# Patient Record
Sex: MALE | Race: WHITE | Employment: FULL TIME | URBAN - METROPOLITAN AREA
[De-identification: names, ages, dates, MRNs, and addresses within clinical notes are randomized per-mention and may not be internally consistent; named-entity substitution may affect disease eponyms.]

---

## 2018-06-12 PROBLEM — Z86.010 HISTORY OF COLON POLYPS: Status: ACTIVE | Noted: 2018-06-12

## 2022-03-19 PROBLEM — Z86.0100 HISTORY OF COLON POLYPS: Status: ACTIVE | Noted: 2018-06-12

## 2022-03-21 ENCOUNTER — APPOINTMENT (OUTPATIENT)
Dept: PHYSICAL THERAPY | Age: 77
End: 2022-03-21
Payer: COMMERCIAL

## 2022-03-31 ENCOUNTER — HOSPITAL ENCOUNTER (OUTPATIENT)
Dept: PHYSICAL THERAPY | Age: 77
Discharge: HOME | End: 2022-03-31
Payer: COMMERCIAL

## 2022-03-31 PROCEDURE — 97162 PT EVAL MOD COMPLEX 30 MIN: CPT | Performed by: PHYSICAL THERAPIST

## 2022-03-31 PROCEDURE — 97530 THERAPEUTIC ACTIVITIES: CPT | Performed by: PHYSICAL THERAPIST

## 2022-03-31 PROCEDURE — 97140 MANUAL THERAPY 1/> REGIONS: CPT | Performed by: PHYSICAL THERAPIST

## 2022-03-31 NOTE — PROGRESS NOTES
[id number] [facility] AT [facility]  [address] [place] [address], [place], [place]  Phone [phone]  Fax [phone] / [address] [place] [address] PHYSICAL THERAPY SERVICES  Patient Name: Aashish Damon : 1945   Medical   Diagnosis: Left knee pain [M25.562] Treatment Diagnosis: S/p L TKA   Onset Date: 22     Referral Source: Tiffany Salazar MD Start of Care [facility]): 3/31/2022   Prior Hospitalization: See medical history Provider #: [id number]   Prior Level of Function: Indep with ADLS and gait. Comorbidities: DM   Medications: Verified on Patient Summary List   The Plan of Care and following information is based on the information from the initial evaluation.   =================================================================================  Assessment / key information:  Patient is a 68 y.o. female who presents to [facility] with Dx of L knee pain. Pt is s/p L TKA on 22. Pt had HH for 3 weeks and is currently using no AD. Pt lives with family in 2 story home. Pain levels range from 0-1, and takes Tylenol as needed, and using CP 2x/day. Pt c/o consistent swelling and being uncomfortable during sleep (2hrs at a time). Pt demonstrates Indep with stairs in clinic with step to pattern and using B hand rails. 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 Functional limitations: stairs, prolonged walking, squatting, dressing.    Gait: mild   [x] Antalgic      With no AD  ROM / Strength  L Knee A/PROM: -10deg to 110deg, -5deg to 115deg,  R Knee AROM0-120deg  Strength: hip flexion 4- Butch, knee extension L/R: 4/4+, Knee flexion :4/4+    Flexibility: [] Unable to assess at this time  Hamstrings:    (L) Tightness= [] WNL   [] Min   [] Mod   [] Severe -40deg   (R) Tightness= [] WNL   [] Min   [] Mod   [] Severe -28deg  Gastroc:      (L) Tightness= [] WNL   [] Min   [] Mod   [x] Severe    (R) Tightness= [] WNL   [] Min   [x] Mod [] Severe  Balance: ROM EO/EC: 30sec each  Palpation: ant/lateral/post calf  Girth Measurements: 1.5cm at L knee jt line, pitting edema noted in L Lower leg,  2.0 cm at top of calf   small steri strip attached at inferior incision,     Patient scored 59 on FOTO indicating decreased functional activity level and QOL. A home exercise program was demonstrated and provided to address the above objective and functional deficits.  Patient can benefit from PT interventions to improve AROM, strength, decrease swelling and inflammation, to facilitate ADLs & overall functional status.   =================================================================================  Eval Complexity: History: MEDIUM  Complexity : 1-2 comorbidities / personal factors will impact the outcome/ POC Exam:MEDIUM Complexity : 3 Standardized tests and measures addressing body structure, function, activity limitation and / or participation in recreation  Presentation: MEDIUM Complexity : Evolving with changing characteristics  Clinical Decision Making:MEDIUM Complexity : FOTO score of 26-74Overall Complexity:MEDIUM  Problem List: pain affecting function, decrease ROM, decrease strength, edema affecting function, impaired gait/ balance, decrease ADL/ functional abilitiies, decrease activity tolerance, decrease flexibility/ joint mobility and decrease transfer abilities   Treatment Plan may include any combination of the following: Therapeutic exercise, Therapeutic activities, Neuromuscular re-education, Physical agent/modality, Gait/balance training, Manual therapy, Aquatic therapy, Patient education, Self Care training, Functional mobility training, Home safety training and Stair training  Patient / Family readiness to learn indicated by: asking questions, trying to perform skills and interest  Persons(s) to be included in education: patient (P)  Barriers to Learning/Limitations: None  Measures taken:    Patient Goal (s): Knee back to normal, walking 1 mi. Patient self reported health status: fair  Rehabilitation Potential: fair   Short Term Goals: To be accomplished in  2-3  weeks:  1. Patient will be compliant with initial HEP for sx management to address the above listed deficits. 2.    Patient will increase L knee PROM to -5 to 120deg to facilitate transfers, dressing, and stairs. 3.    Patient to decrease swelling to less than 1 cm at jt line.  Long Term Goals: To be accomplished in  12  treatments:  1. Patient to be independent & compliant with HEP in preparation for D/C.   2. Patient to increase FOTO score to 77 indicating improved functional abilities and QOL. 3.     Patient to increase strength in L LE to 5, as evidenced by descending stairs with good control on L,  to facilitate stairs and transfers. .   4. Patient will increase L knee AROM to 0 to 120 deg to facilitate transfers, dressing, and stairs. Frequency / Duration:   Patient to be seen  2  times per week for 12  treatments:  (All LTG as above will be assessed and updated every 10 visits or 30 days and progressed as needed)  Patient / Caregiver education and instruction: self care and exercises    Therapist Signature: Chris Zheng PT Date: 5/94/5722   Certification Period: na Time: 10:29 AM   ===========================================================================================  I certify that the above Physical Therapy Services are being furnished while the patient is under my care. I agree with the treatment plan and certify that this therapy is necessary. Physician Signature:        Date:       Time:     Please sign and return to [facility] or you may fax the signed copy to [phone]. Thank you.                                         Renny Varghese MD    Allergies checked: irene

## 2022-03-31 NOTE — PROGRESS NOTES
PT  EVAL AND TREATMENT    Patient Name: Acacia Murphy  Date:3/31/2022  : 1945  [x]  Patient  Verified  Payor: Erich Contreras / Plan: Cruzito Corral / Product Type: HMO /    In time:1215pm  Out time:100pm  Total Treatment Time (min): 45min  Total Timed Codes (min): 45min  1:1 Treatment Time (MC only): 45min   Visit #: 1 of 12    Treatment Area: Left knee pain [M25.562]  Pain in: 0  Pain out 0    Objective evaluation:  Physical Therapy Evaluation - Knee  Pt is s/p L TKA on 22. Pt had HH for 3 weeks and is currently using no AD. Pt lives with family in 2 story home. Pain levels range from 0-1, and takes Tylenol as needed, and using CP 2x/day. Pt c/o consistent swelling and being uncomfortable during sleep (2hrs at a time). Pt demonstrates Indep with stairs in clinic with step to pattern and using B hand rails. Functional limitations: stairs, prolonged walking, squatting, dressing.    Gait: mild   [x] Antalgic      With no AD  ROM / Strength  L Knee A/PROM: -10deg to 110deg, -5deg to 115deg,  R Knee AROM0-120deg  Strength: hip flexion 4- Butch, knee extension L/R: 4/4+, Knee flexion :4/4+    Flexibility: [] Unable to assess at this time  Hamstrings:    (L) Tightness= [] WNL   [] Min   [] Mod   [] Severe -40deg   (R) Tightness= [] WNL   [] Min   [] Mod   [] Severe -28deg  Gastroc:      (L) Tightness= [] WNL   [] Min   [] Mod   [x] Severe    (R) Tightness= [] WNL   [] Min   [x] Mod   [] Severe    Palpation: ant/lateral/post calf  Girth Measurements: 1.5cm at L knee jt line, pitting edema noted in L Lower leg,  2.0 cm at top of calf   small steri strip attached at inferior incision,      Justification for Eval Code Complexity:  Patient History : DM  Examination see exam as above   Clinical Presentation: evolving  Clinical Decision Making : FOTO : 59 /100    Vasopnuematic compression justification:  Per bilateral girth measures taken and listed above the edema is considered significant and having an impact on the patient's gait      15 min Therapeutic Activity:  []  See flow sheet :Patient education on therapy assessment, prognosis, expectations for therapy sessions, patient goals,  and HEP Development. Rationale: increase ROM, increase strength, and improve coordination  to improve the patients ability to perform functional mobility and improve activity  endurance    8 min Manual Therapy:  TKE   The manual therapy interventions were performed at a separate and distinct time from the therapeutic activities interventions. Rationale: decrease pain, increase ROM, increase tissue extensibility, decrease trigger points, and increase postural awareness to allow for normalized gait. ASSESSMENT  [x]  See Plan of Care    PLAN  [x]  Upgrade activities as tolerated     [x] Other:_ POC  Patient to be seen 2 /wk for 12  treatments.        Glenna Mayo, PT 3/31/2022  10:27 AM

## 2022-04-05 ENCOUNTER — HOSPITAL ENCOUNTER (OUTPATIENT)
Dept: PHYSICAL THERAPY | Age: 77
Discharge: HOME | End: 2022-04-05
Payer: COMMERCIAL

## 2022-04-05 PROCEDURE — 97530 THERAPEUTIC ACTIVITIES: CPT

## 2022-04-05 PROCEDURE — 97140 MANUAL THERAPY 1/> REGIONS: CPT

## 2022-04-05 PROCEDURE — 97110 THERAPEUTIC EXERCISES: CPT

## 2022-04-05 NOTE — PROGRESS NOTES
PT DAILY TREATMENT NOTE     Patient Name: Aimee Arriola  Date:2022  : 1945  [x]  Patient  Verified  Payor: Kel Montalvo / Plan: Radha Perdue / Product Type: HMO /    In time:2:00  Out time:2:45  Total Treatment Time (min): 39  Visit #: 2 of 12    Medicare/BCBS Only   Total Timed Codes (min):  45 1:1 Treatment Time:  45       Treatment Area: Left knee pain [M25.562]    SUBJECTIVE  Pain Level (0-10 scale): 0/10  Any medication changes, allergies to medications, adverse drug reactions, diagnosis change, or new procedure performed?: [x] No    [] Yes (see summary sheet for update)  Subjective functional status/changes:   [] No changes reported  The patient reports his knee is feeling really, really good. No questions regarding IE. He reports good compliance with his HEP.      OBJECTIVE    Modality rationale: decrease inflammation and decrease pain to improve the patients walking tolerance    Min Type Additional Details    [] Estim:  []Unatt       []IFC  []Premod                        []Other:  []w/ice   []w/heat  Position:  Location:    [] Estim: []Att    []TENS instruct  []NMES                    []Other:  []w/US   []w/ice   []w/heat  Position:  Location:    []  Traction: [] Cervical       []Lumbar                       [] Prone          []Supine                       []Intermittent   []Continuous Lbs:  [] before manual  [] after manual    []  Ultrasound: []Continuous   [] Pulsed                           []1MHz   []3MHz W/cm2:  Location:    []  Iontophoresis with dexamethasone         Location: [] Take home patch   [] In clinic   PD []  Ice     []  heat  []  Ice massage  []  Laser   []  Anodyne Position:  Location:    []  Laser with stim  []  Other:  Position:  Location:    []  Vasopneumatic Device  Pre-treatment girth:   Post-treatment girth:   Measured at landmark location:  Pressure:       [] lo [] med [] hi   Temperature: [] lo [] med [] hi   [] Skin assessment post-treatment:  []intact []redness- no adverse reaction    []redness - adverse reaction:   Vasopnuematic compression justification:  Per bilateral girth measures taken and listed above the edema is considered significant and having an impact on the patient's balance and gait      20 min Therapeutic Exercise:  [] See flow sheet :  Bike for improved ROM   Incline stretch  Hamstring stretch  Heel raises, toe raises   TKE with ball   LAQ  SAQ   Rationale: increase ROM and increase strength to improve the patients ability to ambulate stairs     15 min Therapeutic Activity:  []  See flow sheet :  Mini squats  Step ups fwd, lat  3 way hip for improved standing tolerance  Tandem stance at instep for improved standing tolerance    Rationale: increase ROM and increase strength  to improve the patients walking tolerance      x min Neuromuscular Re-education:  []  See flow sheet :   Rationale: increase ROM and increase strength  to improve the patients gait stability     10 min Manual Therapy:  Seated EOB, PROM and distraction    The manual therapy interventions were performed at a separate and distinct time from the therapeutic activities interventions. Rationale: decrease pain and increase ROM to improve the patient's gait mechnica          With   [] TE   [] TA   [] neuro   [] other: Patient Education: [x] Review HEP    [] Progressed/Changed HEP based on:   [] positioning   [] body mechanics   [] transfers   [] heat/ice application    [] other:      Other Objective/Functional Measures:   Noted decreased eccentric control of L quad with step ups  Increased quad lag present with SLR flexion        Pain Level (0-10 scale) post treatment: 0/10     ASSESSMENT/Changes in Function:   The patient was challenged by step ups, noted decreased eccentric quad control on the L requiring UE support throughout. The patient demonstrated quad lag with SLR flexion.  Noted continued limitation in L knee extension AROM, improved following manual treatment per visual inspection. Continue to progress as tolerated. Patient will continue to benefit from skilled PT services to modify and progress therapeutic interventions, address functional mobility deficits, address ROM deficits and address strength deficits to attain remaining goals. [x]  See Plan of Care  []  See progress note/recertification  []  See Discharge Summary         Progress towards goals / Updated goals: · Short Term Goals: To be accomplished in  2-3  weeks:  1. Patient will be compliant with initial HEP for sx management to address the above listed deficits. The patient reports good compliance 04/05/2022  2. Patient will increase L knee PROM to -5 to 120deg to facilitate transfers, dressing, and stairs. 3.    Patient to decrease swelling to less than 1 cm at jt line. · Long Term Goals: To be accomplished in  12  treatments:  1. Patient to be independent & compliant with HEP in preparation for D/C.   2. Patient to increase FOTO score to 77 indicating improved functional abilities and QOL. 3.     Patient to increase strength in L LE to 5, as evidenced by descending stairs with good control on L,  to facilitate stairs and transfers. .   4. Patient will increase L knee AROM to 0 to 120 deg to facilitate transfers, dressing, and stairs.      PLAN  []  Upgrade activities as tolerated     []  Continue plan of care  []  Update interventions per flow sheet       []  Discharge due to:_  [x]  Other: assess goals collins Iraheta, PT 4/5/2022  2:05 PM    Future Appointments   Date Time Provider Cole Kerns   4/7/2022 12:15 PM Zulema Marquis PT [facility]   4/11/2022  2:00 PM Zulema Marquis PT [facility]   4/13/2022  2:00 PM Caleb Kitchens [facility]   4/18/2022  2:00 PM Zulema Marquis PT [facility]   4/21/2022 12:15 PM Zulema Marquis PT [facility]   4/25/2022  2:00 PM Zulema Marquis PT [facility]   4/28/2022  2:00 PM Zulema Marquis PT [facility]   5/2/2022  2:00 PM Murray Jack CAM, PT [facility]   5/5/2022  2:00 PM Joselin Terry, PT [facility]

## 2022-04-07 ENCOUNTER — HOSPITAL ENCOUNTER (OUTPATIENT)
Dept: PHYSICAL THERAPY | Age: 77
Discharge: HOME | End: 2022-04-07
Payer: COMMERCIAL

## 2022-04-07 PROCEDURE — 97530 THERAPEUTIC ACTIVITIES: CPT | Performed by: PHYSICAL THERAPIST

## 2022-04-07 PROCEDURE — 97110 THERAPEUTIC EXERCISES: CPT | Performed by: PHYSICAL THERAPIST

## 2022-04-07 PROCEDURE — 97140 MANUAL THERAPY 1/> REGIONS: CPT | Performed by: PHYSICAL THERAPIST

## 2022-04-07 NOTE — PROGRESS NOTES
PT DAILY TREATMENT NOTE     Patient Name: Uriah Rosado  DDRY:  : 1945  [x]  Patient  Verified  Payor: Seth Nettles / Plan: Parish Rogers / Product Type: HMO /    In time: 1218pm  Out time: 101pm  Total Treatment Time (min): 43  Visit #: 3 of 12    Medicare/BCBS Only   Total Timed Codes (min):  43 1:1 Treatment Time:  38       Treatment Area: Left knee pain [M25.562]    SUBJECTIVE  Pain Level (0-10 scale): 0/10  Any medication changes, allergies to medications, adverse drug reactions, diagnosis change, or new procedure performed?: [x] No    [] Yes (see summary sheet for update)  Subjective functional status/changes:   [] No changes reported  The patient reports he is still sleeping on couch, and is only taking Tylenol and icicng for pain relief. Swelling is better in lower leg.      OBJECTIVE    Modality rationale: decrease inflammation and decrease pain to improve the patients walking tolerance    Min Type Additional Details    [] Estim:  []Unatt       []IFC  []Premod                        []Other:  []w/ice   []w/heat  Position:  Location:    [] Estim: []Att    []TENS instruct  []NMES                    []Other:  []w/US   []w/ice   []w/heat  Position:  Location:    []  Traction: [] Cervical       []Lumbar                       [] Prone          []Supine                       []Intermittent   []Continuous Lbs:  [] before manual  [] after manual    []  Ultrasound: []Continuous   [] Pulsed                           []1MHz   []3MHz W/cm2:  Location:    []  Iontophoresis with dexamethasone         Location: [] Take home patch   [] In clinic   PD []  Ice     []  heat  []  Ice massage  []  Laser   []  Anodyne Position:  Location:    []  Laser with stim  []  Other:  Position:  Location:    []  Vasopneumatic Device  Pre-treatment girth:   Post-treatment girth:   Measured at landmark location:  Pressure:       [] lo [] med [] hi   Temperature: [] lo [] med [] hi   [] Skin assessment post-treatment: []intact []redness- no adverse reaction    []redness - adverse reaction:   Vasopnuematic compression justification:  Per bilateral girth measures taken and listed above the edema is considered significant and having an impact on the patient's balance and gait      20 min Therapeutic Exercise:  [] See flow sheet :  Bike for improved ROM   Incline stretch  Hamstring stretch  Heel raises, toe raises   TKE with ball   LAQ  SAQ   Rationale: increase ROM and increase strength to improve the patients ability to ambulate stairs     13 min Therapeutic Activity:  []  See flow sheet :  Mini squats  Step ups fwd, lat  3 way hip for improved standing tolerance  Tandem stance at instep for improved standing tolerance    Rationale: increase ROM and increase strength  to improve the patients walking tolerance      x min Neuromuscular Re-education:  []  See flow sheet :   Rationale: increase ROM and increase strength  to improve the patients gait stability     10  min Manual Therapy:  Reclined head elevated, PROM TKE mobes, flexion mobes and distraction , edema massage. The manual therapy interventions were performed at a separate and distinct time from the therapeutic activities interventions. Rationale: decrease pain and increase ROM to improve the patient's gait mechnica          With   [] TE   [] TA   [] neuro   [] other: Patient Education: [x] Review HEP    [] Progressed/Changed HEP based on:   [] positioning   [] body mechanics   [] transfers   [] heat/ice application    [] other:      Other Objective/Functional Measures:   Noted decreased eccentric control of L quad with step ups  Increased quad lag present with SLR flexion    PROM: 0 to 115deg, AAROM flexion: 110deg with sheet. Swelling: improved in upper calf but continues with pitting edema in lower leg.     Pain Level (0-10 scale) post treatment: 0/10     ASSESSMENT/Changes in Function:   The patient is progressing with flexion and continues to be limited in TKE with gait and SLR. Good tolerance of TKE mobes with manual therapy and able to achieve 0deg PROM with mobes. Pt has SOb throughout session and c/o mod fatigue following last session for a couple of days. Continue to progress as tolerated. Patient will continue to benefit from skilled PT services to modify and progress therapeutic interventions, address functional mobility deficits, address ROM deficits and address strength deficits to attain remaining goals. [x]  See Plan of Care  []  See progress note/recertification  []  See Discharge Summary         Progress towards goals / Updated goals: · Short Term Goals: To be accomplished in  2-3  weeks:  1. Patient will be compliant with initial HEP for sx management to address the above listed deficits. The patient reports good compliance 04/05/2022  2. Patient will increase L knee PROM to -5 to 120deg to facilitate transfers, dressing, and stairs. PROM 0-115deg 4/7/22  3. Patient to decrease swelling to less than 1 cm at jt line. · Long Term Goals: To be accomplished in  12  treatments:  1. Patient to be independent & compliant with HEP in preparation for D/C.   2. Patient to increase FOTO score to 77 indicating improved functional abilities and QOL. 3.     Patient to increase strength in L LE to 5, as evidenced by descending stairs with good control on L,  to facilitate stairs and transfers. .   4. Patient will increase L knee AROM to 0 to 120 deg to facilitate transfers, dressing, and stairs.      PLAN  []  Upgrade activities as tolerated     []  Continue plan of care  []  Update interventions per flow sheet       []  Discharge due to:_  [x]  Other:        Katarina Sandoval PT 4/7/2022  2:05 PM    Future Appointments   Date Time Provider Cole Kerns   4/7/2022 12:15 PM Rylie Block, PT [facility]   4/11/2022  2:00 PM Rylie Block, PT [facility]   4/13/2022  2:00 PM Kody Torres [facility]   4/18/2022  2:00 PM Rylie Block, PT [facility]   4/21/2022 12:15 PM Loli Bailey, PT [facility]   4/25/2022  2:00 PM Loli Bailey, PT [facility]   4/28/2022  2:00 PM Loli Bailey, PT [facility]   5/2/2022  2:00 PM Loli Bailey, PT [facility]   5/5/2022  2:00 PM Loli Bailey, PT [facility]

## 2022-04-11 ENCOUNTER — HOSPITAL ENCOUNTER (OUTPATIENT)
Dept: PHYSICAL THERAPY | Age: 77
Discharge: HOME | End: 2022-04-11
Payer: COMMERCIAL

## 2022-04-11 PROCEDURE — 97140 MANUAL THERAPY 1/> REGIONS: CPT | Performed by: PHYSICAL THERAPIST

## 2022-04-11 PROCEDURE — 97530 THERAPEUTIC ACTIVITIES: CPT | Performed by: PHYSICAL THERAPIST

## 2022-04-11 PROCEDURE — 97110 THERAPEUTIC EXERCISES: CPT | Performed by: PHYSICAL THERAPIST

## 2022-04-11 NOTE — PROGRESS NOTES
PT DAILY TREATMENT NOTE     Patient Name: Sayra Wahl  OFJE:[id number]  : 1945  [x]  Patient  Verified  Payor: Tiana Kanner / Plan: Bulmaro Medeiros / Product Type: HMO /    In time: 200 pm  Out time: 245pm  Total Treatment Time (min): 45min  Visit #: 4 of 12    Medicare/BCBS Only   Total Timed Codes (min):  45min 1:1 Treatment Time:  40min       Treatment Area: Left knee pain [M25.562]    SUBJECTIVE  Pain Level (0-10 scale): 0/10  Any medication changes, allergies to medications, adverse drug reactions, diagnosis change, or new procedure performed?: [x] No    [] Yes (see summary sheet for update)  Subjective functional status/changes:   [] No changes reported  The patient reports he is taking less Tylenol, more just at night to help sleep.    OBJECTIVE    Modality rationale: decrease inflammation and decrease pain to improve the patients walking tolerance    Min Type Additional Details    [] Estim:  []Unatt       []IFC  []Premod                        []Other:  []w/ice   []w/heat  Position:  Location:    [] Estim: []Att    []TENS instruct  []NMES                    []Other:  []w/US   []w/ice   []w/heat  Position:  Location:    []  Traction: [] Cervical       []Lumbar                       [] Prone          []Supine                       []Intermittent   []Continuous Lbs:  [] before manual  [] after manual    []  Ultrasound: []Continuous   [] Pulsed                           []1MHz   []3MHz W/cm2:  Location:    []  Iontophoresis with dexamethasone         Location: [] Take home patch   [] In clinic   PD []  Ice     []  heat  []  Ice massage  []  Laser   []  Anodyne Position:  Location:    []  Laser with stim  []  Other:  Position:  Location:    []  Vasopneumatic Device  Pre-treatment girth:   Post-treatment girth:   Measured at landmark location:  Pressure:       [] lo [] med [] hi   Temperature: [] lo [] med [] hi   [] Skin assessment post-treatment:  []intact []redness- no adverse reaction    []redness - adverse reaction:   Vasopnuematic compression justification:  Per bilateral girth measures taken and listed above the edema is considered significant and having an impact on the patient's balance and gait      20 min Therapeutic Exercise:  [] See flow sheet :  Bike for improved ROM   Incline stretch  Hamstring stretch  Heel raises, toe raises   TKE with ball   LAQ  SAQ   Rationale: increase ROM and increase strength to improve the patients ability to ambulate stairs     15 min Therapeutic Activity:  []  See flow sheet :  Mini squats  Step ups fwd, lat  3 way hip for improved standing tolerance  Tandem stance at instep for improved standing tolerance    Rationale: increase ROM and increase strength  to improve the patients walking tolerance      x min Neuromuscular Re-education:  []  See flow sheet :   Rationale: increase ROM and increase strength  to improve the patients gait stability     10  min Manual Therapy:  Reclined head elevated, PROM TKE mobes, flexion mobes and distraction , edema massage. The manual therapy interventions were performed at a separate and distinct time from the therapeutic activities interventions. Rationale: decrease pain and increase ROM to improve the patient's gait mechnica          With   [] TE   [] TA   [] neuro   [] other: Patient Education: [x] Review HEP    [] Progressed/Changed HEP based on:   [] positioning   [] body mechanics   [] transfers   [] heat/ice application    [] other:      Other Objective/Functional Measures:   1.5cm of swelling at knee jt  increased reps per flow sheet and added 2# for LAQ SAQ today. PROM 0-120deg     Pain Level (0-10 scale) post treatment: 0/10     ASSESSMENT/Changes in Function:   The patient is progressing well with PROM at 0-120deg today. Pt continues with pitting edema in knee jt and lower leg. Continue to progress as tolerated.     Patient will continue to benefit from skilled PT services to modify and progress therapeutic interventions, address functional mobility deficits, address ROM deficits and address strength deficits to attain remaining goals. [x]  See Plan of Care  []  See progress note/recertification  []  See Discharge Summary         Progress towards goals / Updated goals: · Short Term Goals: To be accomplished in  2-3  weeks:  1. Patient will be compliant with initial HEP for sx management to address the above listed deficits. The patient reports good compliance 04/05/2022  2. Patient will increase L knee PROM to -5 to 120deg to facilitate transfers, dressing, and stairs. PROM 0-115deg 4/7/22  3. Patient to decrease swelling to less than 1 cm at jt line. 1.5cm at jt line 4/11/22    · Long Term Goals: To be accomplished in  12  treatments:  1. Patient to be independent & compliant with HEP in preparation for D/C.   2. Patient to increase FOTO score to 77 indicating improved functional abilities and QOL. 3.     Patient to increase strength in L LE to 5, as evidenced by descending stairs with good control on L,  to facilitate stairs and transfers. .   4. Patient will increase L knee AROM to 0 to 120 deg to facilitate transfers, dressing, and stairs.      PLAN  []  Upgrade activities as tolerated     []  Continue plan of care  []  Update interventions per flow sheet       []  Discharge due to:_  [x]  Other:        Marisel Barth PT 4/11/2022  2:05 PM    Future Appointments   Date Time Provider Cole Kerns   4/11/2022  2:00 PM Junior Dozier PT [facility]   4/13/2022  2:00 PM Precious Breach [facility]   4/18/2022  2:00 PM Junior Dozier PT [facility]   4/21/2022 12:15 PM Junior Jacoby PT [facility]   4/25/2022  2:00 PM Junior Jacoby PT [facility]   4/28/2022  2:00 PM Junior Jacoby PT [facility]   5/2/2022  2:00 PM Junior Dozier, PT [facility]   5/5/2022  2:00 PM Junior Dozier, PT [facility]

## 2022-04-13 ENCOUNTER — HOSPITAL ENCOUNTER (OUTPATIENT)
Dept: PHYSICAL THERAPY | Age: 77
Discharge: HOME | End: 2022-04-13
Payer: COMMERCIAL

## 2022-04-13 PROCEDURE — 97530 THERAPEUTIC ACTIVITIES: CPT

## 2022-04-13 PROCEDURE — 97140 MANUAL THERAPY 1/> REGIONS: CPT

## 2022-04-13 PROCEDURE — 97110 THERAPEUTIC EXERCISES: CPT

## 2022-04-13 NOTE — PROGRESS NOTES
PT DAILY TREATMENT NOTE     Patient Name: Abida Munguia  GEOE:  : 1945  [x]  Patient  Verified  Payor: Yohannes Roberson / Plan: Clive Duncan / Product Type: HMO /    In time: 200 pm  Out time: 2:50  Total Treatment Time (min): 50  Visit #: 5 of 12    Medicare/BCBS Only   Total Timed Codes (min):  50 1:1 Treatment Time:  45       Treatment Area: Left knee pain [M25.562]    SUBJECTIVE  Pain Level (0-10 scale): 0/10  Any medication changes, allergies to medications, adverse drug reactions, diagnosis change, or new procedure performed?: [x] No    [] Yes (see summary sheet for update)  Subjective functional status/changes:   [] No changes reported  I am doing great       OBJECTIVE    Modality rationale: decrease inflammation and decrease pain to improve the patients walking tolerance    Min Type Additional Details    [] Estim:  []Unatt       []IFC  []Premod                        []Other:  []w/ice   []w/heat  Position:  Location:    [] Estim: []Att    []TENS instruct  []NMES                    []Other:  []w/US   []w/ice   []w/heat  Position:  Location:    []  Traction: [] Cervical       []Lumbar                       [] Prone          []Supine                       []Intermittent   []Continuous Lbs:  [] before manual  [] after manual    []  Ultrasound: []Continuous   [] Pulsed                           []1MHz   []3MHz W/cm2:  Location:    []  Iontophoresis with dexamethasone         Location: [] Take home patch   [] In clinic   PD []  Ice     []  heat  []  Ice massage  []  Laser   []  Anodyne Position:  Location:    []  Laser with stim  []  Other:  Position:  Location:    []  Vasopneumatic Device  Pre-treatment girth:   Post-treatment girth:   Measured at landmark location:  Pressure:       [] lo [] med [] hi   Temperature: [] lo [] med [] hi   [] Skin assessment post-treatment:  []intact []redness- no adverse reaction    []redness - adverse reaction:   Vasopnuematic compression justification:  Per bilateral girth measures taken and listed above the edema is considered significant and having an impact on the patient's balance and gait      18 min Therapeutic Exercise:  [] See flow sheet :  Bike for improved ROM   Incline stretch  Hamstring stretch  Heel raises, toe raises   TKE with band   LAQ #3  SAQ #3   Rationale: increase ROM and increase strength to improve the patients ability to ambulate stairs     20 min Therapeutic Activity:  []  See flow sheet :  Mini squats  Step ups fwd, lat  3 way hip for improved standing tolerance  Tandem stance at instep for improved standing tolerance   Total gym with double leg squats level 26   Rationale: increase ROM and increase strength  to improve the patients walking tolerance      x min Neuromuscular Re-education:  []  See flow sheet :   Rationale: increase ROM and increase strength  to improve the patients gait stability     12  min Manual Therapy:  Reclined head elevated, PROM TKE mobes, flexion mobes and distraction , edema massage. The manual therapy interventions were performed at a separate and distinct time from the therapeutic activities interventions. Rationale: decrease pain and increase ROM to improve the patient's gait mechnica          With   [] TE   [] TA   [] neuro   [] other: Patient Education: [x] Review HEP    [] Progressed/Changed HEP based on:   [] positioning   [] body mechanics   [] transfers   [] heat/ice application    [] other:      Other Objective/Functional Measures: Added total gym with double leg squat - level 26  Added #3 to LAQ and to [address] [place] [address] from ball to orange TB    Pain Level (0-10 scale) post treatment: 0/10     ASSESSMENT/Changes in Function:   Concerned regarding pitting edema in (L) LE and some into the (R) leg - encourage patient to speak to Dr. Sharlene Posadas regarding this on his next follow up on 5/4/2022.  Patient is progressing well with increasing strength and endurance   Patient will continue to benefit from skilled PT services to modify and progress therapeutic interventions, address functional mobility deficits, address ROM deficits and address strength deficits to attain remaining goals. [x]  See Plan of Care  []  See progress note/recertification  []  See Discharge Summary         Progress towards goals / Updated goals: · Short Term Goals: To be accomplished in  2-3  weeks:  1. Patient will be compliant with initial HEP for sx management to address the above listed deficits. The patient reports good compliance 04/05/2022  2. Patient will increase L knee PROM to -5 to 120deg to facilitate transfers, dressing, and stairs. PROM 0-115deg 4/7/22  3. Patient to decrease swelling to less than 1 cm at jt line. 1.5cm at jt line 4/11/22    · Long Term Goals: To be accomplished in  12  treatments:  1. Patient to be independent & compliant with HEP in preparation for D/C.   2. Patient to increase FOTO score to 77 indicating improved functional abilities and QOL. 3.     Patient to increase strength in L LE to 5, as evidenced by descending stairs with good control on L,  to facilitate stairs and transfers. .   4. Patient will increase L knee AROM to 0 to 120 deg to facilitate transfers, dressing, and stairs.      PLAN  []  Upgrade activities as tolerated     []  Continue plan of care  []  Update interventions per flow sheet       []  Discharge due to:_  []  Other:        Ana Avalos, SHIRLEY 4/13/2022  2:05 PM    Future Appointments   Date Time Provider Cole Kerns   4/18/2022  2:00 PM Ho Wilde PT [facility]   4/21/2022 12:15 PM Ho Wilde PT [facility]   4/25/2022  2:00 PM Ho Wilde PT [facility]   4/28/2022  2:00 PM Ho Wilde PT [facility]   5/2/2022  2:00 PM Ho Wilde PT [facility]   5/5/2022  2:00 PM Ho Wilde PT [facility]

## 2022-04-18 ENCOUNTER — HOSPITAL ENCOUNTER (OUTPATIENT)
Dept: PHYSICAL THERAPY | Age: 77
Discharge: HOME | End: 2022-04-18
Payer: COMMERCIAL

## 2022-04-18 PROCEDURE — 97530 THERAPEUTIC ACTIVITIES: CPT | Performed by: PHYSICAL THERAPIST

## 2022-04-18 PROCEDURE — 97110 THERAPEUTIC EXERCISES: CPT | Performed by: PHYSICAL THERAPIST

## 2022-04-18 PROCEDURE — 97140 MANUAL THERAPY 1/> REGIONS: CPT | Performed by: PHYSICAL THERAPIST

## 2022-04-18 NOTE — PROGRESS NOTES
PT DAILY TREATMENT NOTE     Patient Name: Ladan Mcdonough  LOFA:  : 1945  [x]  Patient  Verified  Payor: Richie Olivier / Plan: Oneal Florez / Product Type: HMO /    In time: 201 pm  Out time: 248  Total Treatment Time (min): 47min  Visit #: 6 of 12    Medicare/BCBS Only   Total Timed Codes (min):  47 1:1 Treatment Time:  42       Treatment Area: Left knee pain [M25.562]    SUBJECTIVE  Pain Level (0-10 scale): 2/10  Any medication changes, allergies to medications, adverse drug reactions, diagnosis change, or new procedure performed?: [x] No    [] Yes (see summary sheet for update)  Subjective functional status/changes:   [] No changes reported  I was sitting for a while and I went to get up and I felt a big pulling in my L calf.       OBJECTIVE    Modality rationale: decrease inflammation and decrease pain to improve the patients walking tolerance    Min Type Additional Details    [] Estim:  []Unatt       []IFC  []Premod                        []Other:  []w/ice   []w/heat  Position:  Location:    [] Estim: []Att    []TENS instruct  []NMES                    []Other:  []w/US   []w/ice   []w/heat  Position:  Location:    []  Traction: [] Cervical       []Lumbar                       [] Prone          []Supine                       []Intermittent   []Continuous Lbs:  [] before manual  [] after manual    []  Ultrasound: []Continuous   [] Pulsed                           []1MHz   []3MHz W/cm2:  Location:    []  Iontophoresis with dexamethasone         Location: [] Take home patch   [] In clinic   PD []  Ice     []  heat  []  Ice massage  []  Laser   []  Anodyne Position:  Location:    []  Laser with stim  []  Other:  Position:  Location:    []  Vasopneumatic Device  Pre-treatment girth:   Post-treatment girth:   Measured at landmark location:  Pressure:       [] lo [] med [] hi   Temperature: [] lo [] med [] hi   [] Skin assessment post-treatment:  []intact []redness- no adverse reaction    []redness - adverse reaction:   Vasopnuematic compression justification:  Per bilateral girth measures taken and listed above the edema is considered significant and having an impact on the patient's balance and gait      15/10 min Therapeutic Exercise:  [] See flow sheet :  Bike for improved ROM   Incline stretch  Hamstring stretch  Heel raises, toe raises   TKE with band   LAQ #3  SAQ #3   Rationale: increase ROM and increase strength to improve the patients ability to ambulate stairs     20 min Therapeutic Activity:  []  See flow sheet :  Mini squats  Step ups fwd, lat  3 way hip for improved standing tolerance  Tandem stance at instep for improved standing tolerance   Total gym with double leg squats level 26   Rationale: increase ROM and increase strength  to improve the patients walking tolerance      x min Neuromuscular Re-education:  []  See flow sheet :   Rationale: increase ROM and increase strength  to improve the patients gait stability     12  min Manual Therapy:  Reclined head elevated, PROM TKE mobes, flexion mobes and distraction , edema massage. The manual therapy interventions were performed at a separate and distinct time from the therapeutic activities interventions. Rationale: decrease pain and increase ROM to improve the patient's gait mechnica          With   [] TE   [] TA   [] neuro   [] other: Patient Education: [x] Review HEP    [] Progressed/Changed HEP based on:   [] positioning   [] body mechanics   [] transfers   [] heat/ice application    [] other:      Other Objective/Functional Measures:   Edema: pt continues with redness and pitting in LE below knee, TTP in ant tib mm, negative for TTP to calf for DVT, reports increased pulling in calf today.  No heat or tenderness noted in gastroc    A/PROM L knee: -5/0 to 120 deg  Added step ups and overs today on 8in step for stretch    Pain Level (0-10 scale) post treatment: 0/10     ASSESSMENT/Changes in Function:   Patient continues with decreased TKE of ~5deg AROM, PROM is 0deg. Pt able to maintain 120deg of PROM flexion from sessionto session. Pt has a FU with MD about DM on next Thursday, and pt instructed to discuss with MD about persistent swelling and redness in LE. Patient will continue to benefit from skilled PT services to modify and progress therapeutic interventions, address functional mobility deficits, address ROM deficits and address strength deficits to attain remaining goals. [x]  See Plan of Care  []  See progress note/recertification  []  See Discharge Summary         Progress towards goals / Updated goals: · Short Term Goals: To be accomplished in  2-3  weeks:  1. Patient will be compliant with initial HEP for sx management to address the above listed deficits. The patient reports good compliance 04/05/2022  2. Patient will increase L knee PROM to -5 to 120deg to facilitate transfers, dressing, and stairs. PROM 0-115deg 4/7/22, A/PROM L knee: -5/0 to 120 deg 4/18/22  3. Patient to decrease swelling to less than 1 cm at jt line. 1.5cm at jt line 4/11/22    · Long Term Goals: To be accomplished in  12  treatments:  1. Patient to be independent & compliant with HEP in preparation for D/C.   2. Patient to increase FOTO score to 77 indicating improved functional abilities and QOL. 3.     Patient to increase strength in L LE to 5, as evidenced by descending stairs with good control on L,  to facilitate stairs and transfers. .   4. Patient will increase L knee AROM to 0 to 120 deg to facilitate transfers, dressing, and stairs.      PLAN  []  Upgrade activities as tolerated     []  Continue plan of care  []  Update interventions per flow sheet       []  Discharge due to:_  []  Other:        Tom Hernandez, PT 4/18/2022  2:05 PM    Future Appointments   Date Time Provider Cole Kerns   4/18/2022  2:00 PM Loli Bailey, [facility]   4/21/2022 12:15 PM Loli Bailey, [facility]   4/25/2022  2:00 PM Loli Bailey, PT [facility]   4/28/2022  2:00 PM Brisa Parker, PT [facility]   5/2/2022  2:00 PM Brisa Parker, PT [facility]   5/5/2022  2:00 PM Brisa Parker, PT [facility]

## 2022-04-21 ENCOUNTER — HOSPITAL ENCOUNTER (OUTPATIENT)
Dept: PHYSICAL THERAPY | Age: 77
Discharge: HOME | End: 2022-04-21
Payer: COMMERCIAL

## 2022-04-21 PROCEDURE — 97530 THERAPEUTIC ACTIVITIES: CPT | Performed by: PHYSICAL THERAPIST

## 2022-04-21 PROCEDURE — 97110 THERAPEUTIC EXERCISES: CPT | Performed by: PHYSICAL THERAPIST

## 2022-04-21 PROCEDURE — 97140 MANUAL THERAPY 1/> REGIONS: CPT | Performed by: PHYSICAL THERAPIST

## 2022-04-21 NOTE — PROGRESS NOTES
PT DAILY TREATMENT NOTE     Patient Name: Dianne Fernandez  Date:2022  : 1945  [x]  Patient  Verified  Payor: Darci Pinto / Plan: Agata Mohamud / Product Type: HMO /    In time: 4518 pm  Out time: 102pm   Total Treatment Time (min): 47min  Visit #: 7 of 12    Medicare/BCBS Only   Total Timed Codes (min):  47 1:1 Treatment Time:  43       Treatment Area: Left knee pain [M25.562]    SUBJECTIVE  Pain Level (0-10 scale): tightness/10  Any medication changes, allergies to medications, adverse drug reactions, diagnosis change, or new procedure performed?: [x] No    [] Yes (see summary sheet for update)  Subjective functional status/changes:   [] No changes reported  That calf is just so tight still and my legs feel weak. I think its better than last time though.        OBJECTIVE    Modality rationale: decrease inflammation and decrease pain to improve the patients walking tolerance    Min Type Additional Details    [] Estim:  []Unatt       []IFC  []Premod                        []Other:  []w/ice   []w/heat  Position:  Location:    [] Estim: []Att    []TENS instruct  []NMES                    []Other:  []w/US   []w/ice   []w/heat  Position:  Location:    []  Traction: [] Cervical       []Lumbar                       [] Prone          []Supine                       []Intermittent   []Continuous Lbs:  [] before manual  [] after manual    []  Ultrasound: []Continuous   [] Pulsed                           []1MHz   []3MHz W/cm2:  Location:    []  Iontophoresis with dexamethasone         Location: [] Take home patch   [] In clinic   PD []  Ice     []  heat  []  Ice massage  []  Laser   []  Anodyne Position:  Location:    []  Laser with stim  []  Other:  Position:  Location:    []  Vasopneumatic Device  Pre-treatment girth:   Post-treatment girth:   Measured at landmark location:  Pressure:       [] lo [] med [] hi   Temperature: [] lo [] med [] hi   [] Skin assessment post-treatment:  []intact []redness- no adverse reaction    []redness - adverse reaction:   Vasopnuematic compression justification:  Per bilateral girth measures taken and listed above the edema is considered significant and having an impact on the patient's balance and gait      20/15 min Therapeutic Exercise:  [] See flow sheet :  Bike for improved ROM   Incline stretch  Hamstring stretch  Heel raises, toe raises   TKE with band   LAQ #3  SAQ #3   Rationale: increase ROM and increase strength to improve the patients ability to ambulate stairs     12 min Therapeutic Activity:  []  See flow sheet :  Eccentric stands <> sit at mat  Step ups fwd, lat  3 way hip for improved standing tolerance  Tandem stance at instep for improved standing tolerance   Total gym with double leg squats level 26: TC   Rationale: increase ROM and increase strength  to improve the patients walking tolerance      x min Neuromuscular Re-education:  []  See flow sheet :   Rationale: increase ROM and increase strength  to improve the patients gait stability     15 min Manual Therapy:  Reclined head elevated, PROM TKE mobes, flexion mobes and distraction , edema massage. PROM flexion with A heel slide   The manual therapy interventions were performed at a separate and distinct time from the therapeutic activities interventions. Rationale: decrease pain and increase ROM to improve the patient's gait mechnica          With   [] TE   [] TA   [] neuro   [] other: Patient Education: [x] Review HEP    [] Progressed/Changed HEP based on:   [] positioning   [] body mechanics   [] transfers   [] heat/ice application    [] other:      Other Objective/Functional Measures:   PROM: 0-126deg, AROM: -2 to 110deg  Increased to 5# with LAQ  Less redness in lower calf today, no heat noted but continues with hard swelling.      Pain Level (0-10 scale) post treatment: 0 feels 75% better/10     ASSESSMENT/Changes in Function:   Patient reports \"knee feels 75% better\" following MT of edema massage and PROM into flexion and extension with heel slides. Pt's calf presents with less taughtness and redness compared to last session, no heat or TTP noted in calf. Good progression with PROM, continues with limited TKE, most likely due to swelling. Pt ambulates with improved gait pattern on way out. Patient will continue to benefit from skilled PT services to modify and progress therapeutic interventions, address functional mobility deficits, address ROM deficits and address strength deficits to attain remaining goals. [x]  See Plan of Care  []  See progress note/recertification  []  See Discharge Summary         Progress towards goals / Updated goals: · Short Term Goals: To be accomplished in  2-3  weeks:  1. Patient will be compliant with initial HEP for sx management to address the above listed deficits. The patient reports good compliance 04/05/2022  2. Patient will increase L knee PROM to -5 to 120deg to facilitate transfers, dressing, and stairs. PROM 0-115deg 4/7/22, A/PROM L knee: -5/0 to 120 deg 4/18/22, PROM to 126deg 4/21/22  3. Patient to decrease swelling to less than 1 cm at jt line. 1.5cm at jt line 4/11/22    · Long Term Goals: To be accomplished in  12  treatments:  1. Patient to be independent & compliant with HEP in preparation for D/C.   2. Patient to increase FOTO score to 77 indicating improved functional abilities and QOL. 3.     Patient to increase strength in L LE to 5, as evidenced by descending stairs with good control on L,  to facilitate stairs and transfers. .   4. Patient will increase L knee AROM to 0 to 120 deg to facilitate transfers, dressing, and stairs.  Progressing to 110deg today 4/21/22     PLAN  []  Upgrade activities as tolerated     []  Continue plan of care  []  Update interventions per flow sheet       []  Discharge due to:_  []  Other:        Barbara Cronin, PT 4/21/2022  2:05 PM    Future Appointments   Date Time Provider Cole Kerns   4/21/2022 12:15 PM Marsha West, PT [facility]   4/25/2022  2:00 PM Marsha West, PT [facility]   4/28/2022  2:00 PM Marsha West, PT [facility]   5/2/2022  2:00 PM Marsha West, PT [facility]   5/5/2022  2:00 PM Marsha West, PT [facility]

## 2022-04-25 ENCOUNTER — HOSPITAL ENCOUNTER (OUTPATIENT)
Dept: PHYSICAL THERAPY | Age: 77
Discharge: HOME | End: 2022-04-25
Payer: COMMERCIAL

## 2022-04-25 PROCEDURE — 97110 THERAPEUTIC EXERCISES: CPT | Performed by: PHYSICAL THERAPIST

## 2022-04-25 PROCEDURE — 97140 MANUAL THERAPY 1/> REGIONS: CPT | Performed by: PHYSICAL THERAPIST

## 2022-04-25 PROCEDURE — 97530 THERAPEUTIC ACTIVITIES: CPT | Performed by: PHYSICAL THERAPIST

## 2022-04-25 NOTE — PROGRESS NOTES
PT DAILY TREATMENT NOTE     Patient Name: Dylan Kemp  Date:2022  : 1945  [x]  Patient  Verified  Payor: Jim  / Plan: Sarah Belts / Product Type: HMO /    In time: 200 pm  Out time: 253 pm   Total Treatment Time (min): 53 min  Visit #: 8 of 12    Medicare/BCBS Only   Total Timed Codes (min):  53 1:1 Treatment Time:  53       Treatment Area: Left knee pain [M25.562]    SUBJECTIVE  Pain Level (0-10 scale): 0/10  Any medication changes, allergies to medications, adverse drug reactions, diagnosis change, or new procedure performed?: [x] No    [] Yes (see summary sheet for update)  Subjective functional status/changes:   [] No changes reported  I just feel like the quad is just weak.        OBJECTIVE    Modality rationale: decrease inflammation and decrease pain to improve the patients walking tolerance    Min Type Additional Details    [] Estim:  []Unatt       []IFC  []Premod                        []Other:  []w/ice   []w/heat  Position:  Location:    [] Estim: []Att    []TENS instruct  []NMES                    []Other:  []w/US   []w/ice   []w/heat  Position:  Location:    []  Traction: [] Cervical       []Lumbar                       [] Prone          []Supine                       []Intermittent   []Continuous Lbs:  [] before manual  [] after manual    []  Ultrasound: []Continuous   [] Pulsed                           []1MHz   []3MHz W/cm2:  Location:    []  Iontophoresis with dexamethasone         Location: [] Take home patch   [] In clinic   PD []  Ice     []  heat  []  Ice massage  []  Laser   []  Anodyne Position:  Location:    []  Laser with stim  []  Other:  Position:  Location:    []  Vasopneumatic Device  Pre-treatment girth:   Post-treatment girth:   Measured at landmark location:  Pressure:       [] lo [] med [] hi   Temperature: [] lo [] med [] hi   [] Skin assessment post-treatment:  []intact []redness- no adverse reaction    []redness - adverse reaction:   Vasopnuematic compression justification:  Per bilateral girth measures taken and listed above the edema is considered significant and having an impact on the patient's balance and gait      25 min Therapeutic Exercise:  [] See flow sheet :  Bike for improved ROM   Incline stretch  Hamstring stretch  Heel raises, toe raises   TKE with band   LAQ #3  SAQ #3   Rationale: increase ROM and increase strength to improve the patients ability to ambulate stairs     13 min Therapeutic Activity:  []  See flow sheet :  Eccentric stands <> sit at mat  Step ups fwd, lat  3 way hip for improved standing tolerance  Tandem stance at instep for improved standing tolerance   Total gym with double leg squats level 26: TC   Rationale: increase ROM and increase strength  to improve the patients walking tolerance      x min Neuromuscular Re-education:  []  See flow sheet :   Rationale: increase ROM and increase strength  to improve the patients gait stability     15 min Manual Therapy:  Reclined head elevated, PROM TKE mobes, flexion mobes and distraction , edema massage. PROM flexion with A heel slide   The manual therapy interventions were performed at a separate and distinct time from the therapeutic activities interventions. Rationale: decrease pain and increase ROM to improve the patient's gait mechnica          With   [] TE   [] TA   [] neuro   [] other: Patient Education: [x] Review HEP    [] Progressed/Changed HEP based on:   [] positioning   [] body mechanics   [] transfers   [] heat/ice application    [] other:      Other Objective/Functional Measures:   PROM: 0-126 deg, AROM: -5 to 115deg  Eccentric tap down: from 6in   SLR lag -15  Added TG SL Lv15   Pain Level (0-10 scale) post treatment: 0/10     ASSESSMENT/Changes in Function:   Patient due for PN NV. Progressing with AROM but continues with little carry over with TKE from session to session. Pt is working and reports he tries to do stretching throughout the day.  Swelling and redness has improved in lower leg and pt reports a significant improvement after taking potassium. Patient will continue to benefit from skilled PT services to modify and progress therapeutic interventions, address functional mobility deficits, address ROM deficits and address strength deficits to attain remaining goals. [x]  See Plan of Care  []  See progress note/recertification  []  See Discharge Summary         Progress towards goals / Updated goals: · Short Term Goals: To be accomplished in  2-3  weeks:  1. Patient will be compliant with initial HEP for sx management to address the above listed deficits. The patient reports good compliance 04/05/2022  2. Patient will increase L knee PROM to -5 to 120deg to facilitate transfers, dressing, and stairs. PROM 0-115deg 4/7/22, A/PROM L knee: -5/0 to 120 deg 4/18/22, PROM to 126deg 4/21/22 goal MET  3. Patient to decrease swelling to less than 1 cm at jt line. 1.5cm at jt line 4/11/22    · Long Term Goals: To be accomplished in  12  treatments:  1. Patient to be independent & compliant with HEP in preparation for D/C.   2. Patient to increase FOTO score to 77 indicating improved functional abilities and QOL. 3.     Patient to increase strength in L LE to 5, as evidenced by descending stairs with good control on L,  to facilitate stairs and transfers. 4.    Patient will increase L knee AROM to 0 to 120 deg to facilitate transfers, dressing, and stairs.  Progressing to 110deg today 4/21/22     PLAN  [x]  Upgrade activities as tolerated     [x]  Continue plan of care  []  Update interventions per flow sheet       []  Discharge due to:_  [x]  Other:    PN SONI Garcia, PT 4/25/2022  2:05 PM    Future Appointments   Date Time Provider Cole Kerns   4/25/2022  2:00 PM Luuirene Colón, PT [facility]   4/28/2022  2:00 PM Luu Narartis, PT [facility]   5/2/2022  2:00 PM Luu Katarzyna, PT [facility]   5/5/2022  2:00 PM Luu Katarzyna, PT [facility] 5/9/2022  2:00 PM Shahid Stone, PT [facility]   5/16/2022  2:00 PM Shahid Stone, PT [facility]   5/19/2022  2:00 PM Shahid Stone, PT [facility]   5/23/2022  2:00 PM Shahid Stone, PT [facility]   5/31/2022  2:[facility]   6/2/2022  2:00 PM Shahid Stone, PT [facility]

## 2022-04-28 ENCOUNTER — HOSPITAL ENCOUNTER (OUTPATIENT)
Dept: PHYSICAL THERAPY | Age: 77
Discharge: HOME | End: 2022-04-28
Payer: COMMERCIAL

## 2022-04-28 PROCEDURE — 97110 THERAPEUTIC EXERCISES: CPT | Performed by: PHYSICAL THERAPIST

## 2022-04-28 PROCEDURE — 97140 MANUAL THERAPY 1/> REGIONS: CPT | Performed by: PHYSICAL THERAPIST

## 2022-04-28 PROCEDURE — 97530 THERAPEUTIC ACTIVITIES: CPT | Performed by: PHYSICAL THERAPIST

## 2022-04-28 NOTE — PROGRESS NOTES
PT DAILY TREATMENT NOTE     Patient Name: Deepthi Issa  Date:2022  : 1945  [x]  Patient  Verified  Payor: Chester Jon / Plan: Lexus Diehl / Product Type: HMO /    In time: 200 pm  Out time: 253 pm   Total Treatment Time (min): 53 min  Visit #: 9 of 12    Medicare/BCBS Only   Total Timed Codes (min):  53 1:1 Treatment Time:  48       Treatment Area: Left knee pain [M25.562]    SUBJECTIVE  Pain Level (0-10 scale): 0/10  Any medication changes, allergies to medications, adverse drug reactions, diagnosis change, or new procedure performed?: [x] No    [] Yes (see summary sheet for update)  Subjective functional status/changes:   [] No changes reported  See PN      OBJECTIVE    Modality rationale: decrease inflammation and decrease pain to improve the patients walking tolerance    Min Type Additional Details    [] Estim:  []Unatt       []IFC  []Premod                        []Other:  []w/ice   []w/heat  Position:  Location:    [] Estim: []Att    []TENS instruct  []NMES                    []Other:  []w/US   []w/ice   []w/heat  Position:  Location:    []  Traction: [] Cervical       []Lumbar                       [] Prone          []Supine                       []Intermittent   []Continuous Lbs:  [] before manual  [] after manual    []  Ultrasound: []Continuous   [] Pulsed                           []1MHz   []3MHz W/cm2:  Location:    []  Iontophoresis with dexamethasone         Location: [] Take home patch   [] In clinic   PD []  Ice     []  heat  []  Ice massage  []  Laser   []  Anodyne Position:  Location:    []  Laser with stim  []  Other:  Position:  Location:    []  Vasopneumatic Device  Pre-treatment girth:   Post-treatment girth:   Measured at landmark location:  Pressure:       [] lo [] med [] hi   Temperature: [] lo [] med [] hi   [] Skin assessment post-treatment:  []intact []redness- no adverse reaction    []redness - adverse reaction:   Vasopnuematic compression justification:  Per bilateral girth measures taken and listed above the edema is considered significant and having an impact on the patient's balance and gait      16 min Therapeutic Exercise:  [] See flow sheet :  Bike for improved ROM   Incline stretch  Hamstring stretch  Heel raises, toe raises   TKE with band   LAQ #3  SAQ #3   Rationale: increase ROM and increase strength to improve the patients ability to ambulate stairs     25 min Therapeutic Activity:  []  See flow sheet :  Eccentric stands <> sit at mat  Step ups fwd, lat  3 way hip for improved standing tolerance  Tandem stance at instep for improved standing tolerance   FOTO  PN   Rationale: increase ROM and increase strength  to improve the patients walking tolerance      x min Neuromuscular Re-education:  []  See flow sheet :   Rationale: increase ROM and increase strength  to improve the patients gait stability     12 min Manual Therapy:  Reclined head elevated, PROM TKE mobes, flexion mobes and distraction , edema massage. PROM flexion with A heel slide   The manual therapy interventions were performed at a separate and distinct time from the therapeutic activities interventions. Rationale: decrease pain and increase ROM to improve the patient's gait mechnica          With   [] TE   [] TA   [] neuro   [] other: Patient Education: [x] Review HEP    [] Progressed/Changed HEP based on:   [] positioning   [] body mechanics   [] transfers   [] heat/ice application    [] other:      Other Objective/Functional Measures:     Subjective: Patient reports 75-80% improvements in sx since [facility]. Pain levels: Pt reports no pain, just fatigue. Patient's current complaints: \"I still feel weak in my quads\" . Patient performing HEP:  Performs daily.  Walking tolerance: max 10min   Objective: stairs: goes reciprocal about 50% of time  ROM: PROM: 0-128 deg,  AROM: -3 to 115deg,  Strength:  Able to perform 6in tap downs with HH assist.,   Improvements: decreased swelling, improved activity tolerance, improved ROM,  Deficits: walking tolerance, R knee increased sx and pain; scheduled on Aug 2 for R TKA  FOTO: 64/100    Progress towards goals / Updated goals: · Short Term Goals: To be accomplished in  2-3  weeks:  1. Patient will be compliant with initial HEP for sx management to address the above listed deficits. The patient reports good compliance 04/05/2022  2. Patient will increase L knee PROM to -5 to 120deg to facilitate transfers, dressing, and stairs. PROM 0-115deg 4/7/22, A/PROM L knee: -5/0 to 120 deg 4/18/22, PROM to 128deg 4/28/22 goal MET  3. Patient to decrease swelling to less than 1 cm at jt line. 1.5cm at jt line 4/11/22    · Long Term Goals: To be accomplished in  12  treatments:  1. Patient to be independent & compliant with HEP in preparation for D/C.   2. Patient to increase FOTO score to 77 indicating improved functional abilities and QOL. 3.     Patient to increase strength in L LE to 5, as evidenced by descending stairs with good control on L,  to facilitate stairs and transfers. progressed to 6in tap downs with fair stability and Butch UE assist.  4/28/22  4. Patient will increase L knee AROM to 0 to 120 deg to facilitate transfers, dressing, and stairs. Progressing to 110deg today 4/21/22      Pain Level (0-10 scale) post treatment: 0/10     ASSESSMENT/Changes in Function:   See PN   Patient will continue to benefit from skilled PT services to modify and progress therapeutic interventions, address functional mobility deficits, address ROM deficits and address strength deficits to attain remaining goals. []  See Plan of Care  [x]  See progress note/recertification  []  See Discharge Summary           PLAN  [x]  Upgrade activities as tolerated     [x]  Continue plan of care  []  Update interventions per flow sheet       []  Discharge due to:_  [x]  Other:    Patient to continue 2/wk for 8 to 10 treatments.      Ricky Rojo, PT 4/28/2022  2:05 PM    Future Appointments   Date Time Provider Cole Saumya   4/28/2022  2:00 PM Joselin Terry, [facility]   5/2/2022  2:00 PM Joselin Terry PT [facility]   5/5/2022  2:00 PM Joselin Terry PT [facility]   5/9/2022  2:00 PM Joselin Terry PT [facility]   5/16/2022  2:00 PM Joselin Terry PT [facility]   5/19/2022  2:00 PM Joselin Terry PT [facility]   5/23/2022  2:00 PM Joselin Terry PT [facility]   5/31/2022  2:00 PM Shanna Harrison [facility]   6/2/2022  2:00 PM Joselin Terry PT [facility]

## 2022-05-02 ENCOUNTER — HOSPITAL ENCOUNTER (OUTPATIENT)
Dept: PHYSICAL THERAPY | Age: 77
Discharge: HOME | End: 2022-05-02
Payer: COMMERCIAL

## 2022-05-02 PROCEDURE — 97530 THERAPEUTIC ACTIVITIES: CPT | Performed by: PHYSICAL THERAPIST

## 2022-05-02 PROCEDURE — 97110 THERAPEUTIC EXERCISES: CPT | Performed by: PHYSICAL THERAPIST

## 2022-05-02 PROCEDURE — 97140 MANUAL THERAPY 1/> REGIONS: CPT | Performed by: PHYSICAL THERAPIST

## 2022-05-02 NOTE — PROGRESS NOTES
PT DAILY TREATMENT NOTE     Patient Name: Mustapha Gallego  VLBS:3/8/[birth year]  : 1945  [x]  Patient  Verified  Payor: Macy Rios / Plan: Mirta Rust / Product Type: HMO /    In time: 200 pm  Out time: 243 pm   Total Treatment Time (min): 43 min  Visit #: 1 of 8-10    Medicare/BCBS Only   Total Timed Codes (min):  43 1:1 Treatment Time:  38       Treatment Area: Left knee pain [M25.562]    SUBJECTIVE  Pain Level (0-10 scale): 0/10  Any medication changes, allergies to medications, adverse drug reactions, diagnosis change, or new procedure performed?: [x] No    [] Yes (see summary sheet for update)  Subjective functional status/changes:   [] No changes reported  I am doing good! The other knee is giving me trouble buckling sometimes.        OBJECTIVE    Modality rationale: decrease inflammation and decrease pain to improve the patients walking tolerance    Min Type Additional Details    [] Estim:  []Unatt       []IFC  []Premod                        []Other:  []w/ice   []w/heat  Position:  Location:    [] Estim: []Att    []TENS instruct  []NMES                    []Other:  []w/US   []w/ice   []w/heat  Position:  Location:    []  Traction: [] Cervical       []Lumbar                       [] Prone          []Supine                       []Intermittent   []Continuous Lbs:  [] before manual  [] after manual    []  Ultrasound: []Continuous   [] Pulsed                           []1MHz   []3MHz W/cm2:  Location:    []  Iontophoresis with dexamethasone         Location: [] Take home patch   [] In clinic   PD []  Ice     []  heat  []  Ice massage  []  Laser   []  Anodyne Position:  Location:    []  Laser with stim  []  Other:  Position:  Location:    []  Vasopneumatic Device  Pre-treatment girth:   Post-treatment girth:   Measured at landmark location:  Pressure:       [] lo [] med [] hi   Temperature: [] lo [] med [] hi   [] Skin assessment post-treatment:  []intact []redness- no adverse reaction    []redness - adverse reaction:   Vasopnuematic compression justification:  Per bilateral girth measures taken and listed above the edema is considered significant and having an impact on the patient's balance and gait      10 min Therapeutic Exercise:  [] See flow sheet :  Bike for improved ROM   Incline stretch  Hamstring stretch  Heel raises, toe raises   TKE with band   LAQ #3  SAQ #3   Rationale: increase ROM and increase strength to improve the patients ability to ambulate stairs     23 min Therapeutic Activity:  []  See flow sheet :  Eccentric stands <> sit at mat  Step ups fwd, lat  3 way hip for improved standing tolerance  Tandem stance at instep for improved standing tolerance   FOTO  PN   Rationale: increase ROM and increase strength  to improve the patients walking tolerance      x min Neuromuscular Re-education:  []  See flow sheet :   Rationale: increase ROM and increase strength  to improve the patients gait stability     10 min Manual Therapy:  Reclined head elevated, PROM TKE mobes, flexion mobes and distraction , edema massage. PROM flexion with A heel slide   The manual therapy interventions were performed at a separate and distinct time from the therapeutic activities interventions. Rationale: decrease pain and increase ROM to improve the patient's gait mechnica          With   [] TE   [] TA   [] neuro   [] other: Patient Education: [x] Review HEP    [] Progressed/Changed HEP based on:   [] positioning   [] body mechanics   [] transfers   [] heat/ice application    [] other:      Other Objective/Functional Measures:   PROM: 0-126deg       Pain Level (0-10 scale) post treatment: 0/10     ASSESSMENT/Changes in Function:   Patient has a rash on R foot, and is is going back to MD on Wed and will ask about it. One observed episode of R knee buckling today during therapy. Pt continues with lack in TKE for SLR and with decreased eccentric control on stairs descending.  Maintaining flexion PROM at ~130deg consistently. Improved AROM of extension and less pain with TKE mobilizations. Pt reports fear of falling on stairs due to R LE buckling and decreased strength in L. SLS improving. Patient will continue to benefit from skilled PT services to modify and progress therapeutic interventions, address functional mobility deficits, address ROM deficits and address strength deficits to attain remaining goals. · Long Term Goals: To be accomplished in  12  treatments:  1. Patient to be independent & compliant with HEP in preparation for D/C.   2. Patient to increase FOTO score to 77 indicating improved functional abilities and QOL. 3.     Patient to increase strength in L LE to 5, as evidenced by descending stairs with good control on L,  to facilitate stairs and transfers. progressed to 6in tap downs with fair stability and Butch UE assist.  4/28/22  4.    Patient will increase L knee AROM to 0 to 120 deg to facilitate transfers, dressing, and stairs. Progressing to 110deg today 4/21/22   5. Patient to increase walking tolerance to 30 min to facilitate community ambulation and upcomming vacation.        []  See Plan of Care  [x]  See progress note/recertification  []  See Discharge Summary           PLAN  [x]  Upgrade activities as tolerated     [x]  Continue plan of care  []  Update interventions per flow sheet       []  Discharge due to:_  []  Other:        Mandi Dwyer PT 5/2/2022  2:05 PM    Future Appointments   Date Time Provider Cole Kerns   5/2/2022  2:00 PM Alvaro Kirkpatrick, PT [facility]   5/5/2022  2:00 PM Alvaro Block, PT [facility]   5/9/2022  2:00 PM Alvaro Block, PT [facility]   5/16/2022  2:00 PM Alvaro Block, PT [facility]   5/19/2022  2:00 PM Alvaro Block, PT [facility]   5/23/2022  2:00 PM Alvaro Kirkpatrick, PT [facility]   5/31/2022  2:00 PM Dianne Byrne [facility]   6/2/2022  2:00 PM Alvaro Kirkpatrick PT [facility]

## 2022-05-05 ENCOUNTER — HOSPITAL ENCOUNTER (OUTPATIENT)
Dept: PHYSICAL THERAPY | Age: 77
Discharge: HOME | End: 2022-05-05
Payer: COMMERCIAL

## 2022-05-05 PROCEDURE — 97110 THERAPEUTIC EXERCISES: CPT | Performed by: PHYSICAL THERAPIST

## 2022-05-05 PROCEDURE — 97140 MANUAL THERAPY 1/> REGIONS: CPT | Performed by: PHYSICAL THERAPIST

## 2022-05-05 PROCEDURE — 97530 THERAPEUTIC ACTIVITIES: CPT | Performed by: PHYSICAL THERAPIST

## 2022-05-05 NOTE — PROGRESS NOTES
PT DAILY TREATMENT NOTE     Patient Name: Betsey Read  Date:2022  : 1945  [x]  Patient  Verified  Payor: Taylor Cummins / Plan: Marichuy Munroe / Product Type: HMO /    In time: 200 pm  Out time: 240pm   Total Treatment Time (min): 40 min  Visit #: 2 of 8-10    Medicare/BCBS Only   Total Timed Codes (min):  40min 1:1 Treatment Time:         Treatment Area: Left knee pain [M25.562]    SUBJECTIVE  Pain Level (0-10 scale): 0/10  Any medication changes, allergies to medications, adverse drug reactions, diagnosis change, or new procedure performed?: [x] No    [] Yes (see summary sheet for update)  Subjective functional status/changes:   [] No changes reported  I haven't been sleeping well. I went to the surgeon and they said everything was looking great. The dermatologist gave me some cream for the lower part of my leg.      OBJECTIVE    Modality rationale: decrease inflammation and decrease pain to improve the patients walking tolerance    Min Type Additional Details    [] Estim:  []Unatt       []IFC  []Premod                        []Other:  []w/ice   []w/heat  Position:  Location:    [] Estim: []Att    []TENS instruct  []NMES                    []Other:  []w/US   []w/ice   []w/heat  Position:  Location:    []  Traction: [] Cervical       []Lumbar                       [] Prone          []Supine                       []Intermittent   []Continuous Lbs:  [] before manual  [] after manual    []  Ultrasound: []Continuous   [] Pulsed                           []1MHz   []3MHz W/cm2:  Location:    []  Iontophoresis with dexamethasone         Location: [] Take home patch   [] In clinic   PD []  Ice     []  heat  []  Ice massage  []  Laser   []  Anodyne Position:  Location:    []  Laser with stim  []  Other:  Position:  Location:    []  Vasopneumatic Device  Pre-treatment girth:   Post-treatment girth:   Measured at landmark location:  Pressure:       [] lo [] med [] hi   Temperature: [] lo [] med [] hi   [] Skin assessment post-treatment:  []intact []redness- no adverse reaction    []redness - adverse reaction:   Vasopnuematic compression justification:  Per bilateral girth measures taken and listed above the edema is considered significant and having an impact on the patient's balance and gait      10 min Therapeutic Exercise:  [] See flow sheet :  Bike for improved ROM   Incline stretch  Hamstring stretch  Heel raises, toe raises   TKE with band   LAQ #3  SAQ #3   Rationale: increase ROM and increase strength to improve the patients ability to ambulate stairs     20 min Therapeutic Activity:  []  See flow sheet :  Eccentric stands <> sit at mat  Step ups fwd, lat  3 way hip for improved standing tolerance  Tandem stance at instep for improved standing tolerance    Rationale: increase ROM and increase strength  to improve the patients walking tolerance      x min Neuromuscular Re-education:  []  See flow sheet :   Rationale: increase ROM and increase strength  to improve the patients gait stability     10 min Manual Therapy:  Reclined head elevated, PROM TKE mobes, flexion mobes and distraction , edema massage. PROM flexion with A heel slide   The manual therapy interventions were performed at a separate and distinct time from the therapeutic activities interventions. Rationale: decrease pain and increase ROM to improve the patient's gait mechnica          With   [] TE   [] TA   [] neuro   [] other: Patient Education: [x] Review HEP    [] Progressed/Changed HEP based on:   [] positioning   [] body mechanics   [] transfers   [] heat/ice application    [] other:      Other Objective/Functional Measures:   A/PROM: 0 to 115deg /128 deg    Pain Level (0-10 scale) post treatment: 0/10     ASSESSMENT/Changes in Function:   Patient reports he is very tired today and has decreased endurance and mod fatigue throughout session. Deferred last two exercises of LAQ and HS curls.  Pt continues with mild quad lag with SLR and decreased eccentric strength in Quad. Pt reports R knee has buckled on him a couple of times but no falls. Pt continues to remain at ~128deg for PROM and ~115-117deg for AROM. Skin integrity in LE improving with use of new cream dermatologist gave him. Progress to hurdles for gait nv in bars or in open gym with belt  as indicated. Patient will continue to benefit from skilled PT services to modify and progress therapeutic interventions, address functional mobility deficits, address ROM deficits and address strength deficits to attain remaining goals. · Long Term Goals: To be accomplished in  12  treatments:  1. Patient to be independent & compliant with HEP in preparation for D/C.   2. Patient to increase FOTO score to 77 indicating improved functional abilities and QOL. 3.     Patient to increase strength in L LE to 5, as evidenced by descending stairs with good control on L,  to facilitate stairs and transfers. progressed to 6in tap downs with fair stability and Butch UE assist.  4/28/22  4.    Patient will increase L knee AROM to 0 to 120 deg to facilitate transfers, dressing, and stairs. Progressing to 110deg today 4/21/22   5. Patient to increase walking tolerance to 30 min to facilitate community ambulation and upcomming vacation.        []  See Plan of Care  [x]  See progress note/recertification  []  See Discharge Summary           PLAN  [x]  Upgrade activities as tolerated     [x]  Continue plan of care  []  Update interventions per flow sheet       []  Discharge due to:_  []  Other:        Mandi Dwyer PT 5/5/2022  2:05 PM    Future Appointments   Date Time Provider Cole Kerns   5/5/2022  2:00 PM Alvaro Kirkpatrick, PT [facility]   5/9/2022  2:00 PM Alvaro Kirkpatrick PT [facility]   5/23/2022  2:00 PM Alvaro Kirkpatrick PT [facility]   5/31/2022  2:00 PM Dianne Byrne [facility]   6/2/2022  2:00 PM Alvaro Kirkpatrick PT [facility]

## 2022-05-09 ENCOUNTER — HOSPITAL ENCOUNTER (OUTPATIENT)
Dept: PHYSICAL THERAPY | Age: 77
Discharge: HOME | End: 2022-05-09
Payer: COMMERCIAL

## 2022-05-09 PROCEDURE — 97110 THERAPEUTIC EXERCISES: CPT | Performed by: PHYSICAL THERAPIST

## 2022-05-09 PROCEDURE — 97140 MANUAL THERAPY 1/> REGIONS: CPT | Performed by: PHYSICAL THERAPIST

## 2022-05-09 PROCEDURE — 97530 THERAPEUTIC ACTIVITIES: CPT | Performed by: PHYSICAL THERAPIST

## 2022-05-09 NOTE — PROGRESS NOTES
PT DAILY TREATMENT NOTE     Patient Name: Wing Loya  Date:2022  : 1945  [x]  Patient  Verified  Payor: Carlos Lu / Plan: Sahil Severino / Product Type: HMO /    In time: 2:00 pm  Out time: 248 pm   Total Treatment Time (min): 48 min  Visit #: 3 of 8-10    Medicare/BCBS Only   Total Timed Codes (min):  48 min 1:1 Treatment Time:         Treatment Area: Left knee pain [M25.562]    SUBJECTIVE  Pain Level (0-10 scale): 0/10  Any medication changes, allergies to medications, adverse drug reactions, diagnosis change, or new procedure performed?: [x] No    [] Yes (see summary sheet for update)  Subjective functional status/changes:   [] No changes reported  Pt told events over the weekend.  His wife fell and broke her leg in 2 places, so his weekend was \"eventful\"    OBJECTIVE    Modality rationale: decrease inflammation and decrease pain to improve the patients walking tolerance    Min Type Additional Details    [] Estim:  []Unatt       []IFC  []Premod                        []Other:  []w/ice   []w/heat  Position:  Location:    [] Estim: []Att    []TENS instruct  []NMES                    []Other:  []w/US   []w/ice   []w/heat  Position:  Location:    []  Traction: [] Cervical       []Lumbar                       [] Prone          []Supine                       []Intermittent   []Continuous Lbs:  [] before manual  [] after manual    []  Ultrasound: []Continuous   [] Pulsed                           []1MHz   []3MHz W/cm2:  Location:    []  Iontophoresis with dexamethasone         Location: [] Take home patch   [] In clinic   PD []  Ice     []  heat  []  Ice massage  []  Laser   []  Anodyne Position:  Location:    []  Laser with stim  []  Other:  Position:  Location:    []  Vasopneumatic Device  Pre-treatment girth:   Post-treatment girth:   Measured at landmark location:  Pressure:       [] lo [] med [] hi   Temperature: [] lo [] med [] hi   [] Skin assessment post-treatment:  []intact []redness- no adverse reaction    []redness - adverse reaction:   Vasopnuematic compression justification:  Per bilateral girth measures taken and listed above the edema is considered significant and having an impact on the patient's balance and gait      18 min Therapeutic Exercise:  [] See flow sheet :  Bike for improved ROM   Incline stretch  Hamstring stretch  Heel raises, toe raises   TKE with band   LAQ #3; TC   SAQ #3   Rationale: increase ROM and increase strength to improve the patients ability to ambulate stairs     20 min Therapeutic Activity:  []  See flow sheet :  Eccentric stands <> sit at mat  Step ups fwd, lat  3 way hip for improved standing tolerance  Tandem stance at instep for improved standing tolerance    Rationale: increase ROM and increase strength  to improve the patients walking tolerance      x min Neuromuscular Re-education:  []  See flow sheet :   Rationale: increase ROM and increase strength  to improve the patients gait stability     10 min Manual Therapy:  Reclined head elevated, PROM TKE mobes, flexion mobes and distraction , edema massage. PROM flexion with A heel slide   The manual therapy interventions were performed at a separate and distinct time from the therapeutic activities interventions. Rationale: decrease pain and increase ROM to improve the patient's gait mechnica          With   [] TE   [] TA   [] neuro   [] other: Patient Education: [x] Review HEP    [] Progressed/Changed HEP based on:   [] positioning   [] body mechanics   [] transfers   [] heat/ice application    [] other:      Other Objective/Functional Measures:   Progress to hurdles in open gym   Step overs in open gym for curb negotiation. Walking tolerance:  .5mi  SLR lag: 10deg     Pain Level (0-10 scale) post treatment: 0/10     ASSESSMENT/Changes in Function:   Patient maintaining ROM in knee at ~125-128deg from session to session. Continues with mild lack of TKE with gait and SLR. Improving with eccentric lowering. Pt will be out of therapy for next week due to a scheduled vacation, which has now been cancelled due to wifes fall. Pt reports \" I will take next week off\". Patient will continue to benefit from skilled PT services to modify and progress therapeutic interventions, address functional mobility deficits, address ROM deficits and address strength deficits to attain remaining goals. · Long Term Goals: To be accomplished in  12  treatments:  1. Patient to be independent & compliant with HEP in preparation for D/C.   2. Patient to increase FOTO score to 77 indicating improved functional abilities and QOL. 3.     Patient to increase strength in L LE to 5, as evidenced by descending stairs with good control on L,  to facilitate stairs and transfers. progressed to 6in tap downs with fair stability and Butch UE assist.  4/28/22  4.    Patient will increase L knee AROM to 0 to 120 deg to facilitate transfers, dressing, and stairs. Progressing to 110deg today 4/21/22   5. Patient to increase walking tolerance to 30 min to facilitate community ambulation and upcomming vacation.  pt reports up to .5mi   5/9/22      []  See Plan of Care  [x]  See progress note/recertification  []  See Discharge Summary           PLAN  [x]  Upgrade activities as tolerated     [x]  Continue plan of care  []  Update interventions per flow sheet       []  Discharge due to:_  []  Other:        Neelam Frias, PT 5/9/2022  2:05 PM    Future Appointments   Date Time Provider Cole Kerns   5/9/2022  2:00 PM Rosa Vallecillo PT [facility]   5/23/2022  2:00 PM Rosa Vallecillo PT [facility]   5/31/2022  2:00 PM Becca Garsia [facility]   6/2/2022  2:00 PM Rosa Vallecillo, PT [facility]

## 2022-05-16 ENCOUNTER — APPOINTMENT (OUTPATIENT)
Dept: PHYSICAL THERAPY | Age: 77
End: 2022-05-16
Payer: COMMERCIAL

## 2022-05-19 ENCOUNTER — APPOINTMENT (OUTPATIENT)
Dept: PHYSICAL THERAPY | Age: 77
End: 2022-05-19
Payer: COMMERCIAL

## 2022-05-23 ENCOUNTER — HOSPITAL ENCOUNTER (OUTPATIENT)
Dept: PHYSICAL THERAPY | Age: 77
Discharge: HOME | End: 2022-05-23
Payer: COMMERCIAL

## 2022-05-23 PROCEDURE — 97110 THERAPEUTIC EXERCISES: CPT | Performed by: PHYSICAL THERAPIST

## 2022-05-23 PROCEDURE — 97530 THERAPEUTIC ACTIVITIES: CPT | Performed by: PHYSICAL THERAPIST

## 2022-05-23 PROCEDURE — 97140 MANUAL THERAPY 1/> REGIONS: CPT | Performed by: PHYSICAL THERAPIST

## 2022-05-23 NOTE — PROGRESS NOTES
PT DAILY TREATMENT NOTE     Patient Name: Uriah Rosado  Date:2022  : 1945  [x]  Patient  Verified  Payor: Seth Nettles / Plan: Parish Rogers / Product Type: HMO /    In time: 155 pm  Out time: 241 pm   Total Treatment Time (min): 46 min  Visit #: 4 of 8-10    Medicare/BCBS Only   Total Timed Codes (min):  46 min 1:1 Treatment Time:  46min       Treatment Area: Left knee pain [M25.562]    SUBJECTIVE  Pain Level (0-10 scale): 0/10  Any medication changes, allergies to medications, adverse drug reactions, diagnosis change, or new procedure performed?: [x] No    [] Yes (see summary sheet for update)  Subjective functional status/changes:   [] No changes reported  Pt reports next visit might be his last one.      OBJECTIVE    Modality rationale: decrease inflammation and decrease pain to improve the patients walking tolerance    Min Type Additional Details    [] Estim:  []Unatt       []IFC  []Premod                        []Other:  []w/ice   []w/heat  Position:  Location:    [] Estim: []Att    []TENS instruct  []NMES                    []Other:  []w/US   []w/ice   []w/heat  Position:  Location:    []  Traction: [] Cervical       []Lumbar                       [] Prone          []Supine                       []Intermittent   []Continuous Lbs:  [] before manual  [] after manual    []  Ultrasound: []Continuous   [] Pulsed                           []1MHz   []3MHz W/cm2:  Location:    []  Iontophoresis with dexamethasone         Location: [] Take home patch   [] In clinic   PD []  Ice     []  heat  []  Ice massage  []  Laser   []  Anodyne Position:  Location:    []  Laser with stim  []  Other:  Position:  Location:    []  Vasopneumatic Device  Pre-treatment girth:   Post-treatment girth:   Measured at landmark location:  Pressure:       [] lo [] med [] hi   Temperature: [] lo [] med [] hi   [] Skin assessment post-treatment:  []intact []redness- no adverse reaction    []redness - adverse reaction: Vasopnuematic compression justification:  Per bilateral girth measures taken and listed above the edema is considered significant and having an impact on the patient's balance and gait      18 min Therapeutic Exercise:  [] See flow sheet :  Bike for improved ROM   Incline stretch  Hamstring stretch  Heel raises, toe raises   TKE with band   LAQ #3; TC   SAQ #3   Rationale: increase ROM and increase strength to improve the patients ability to ambulate stairs     20 min Therapeutic Activity:  []  See flow sheet :  Eccentric stands <> sit at mat  Step ups fwd, lat  3 way hip for improved standing tolerance  Tandem stance at instep for improved standing tolerance    Rationale: increase ROM and increase strength  to improve the patients walking tolerance      x min Neuromuscular Re-education:  []  See flow sheet :   Rationale: increase ROM and increase strength  to improve the patients gait stability     8 min Manual Therapy:  Reclined head elevated, PROM TKE mobes, flexion mobes and distraction , PROM flexion with A heel slide    The manual therapy interventions were performed at a separate and distinct time from the therapeutic activities interventions. Rationale: decrease pain and increase ROM to improve the patient's gait mechnica          With   [] TE   [] TA   [] neuro   [] other: Patient Education: [x] Review HEP    [] Progressed/Changed HEP based on:   [] positioning   [] body mechanics   [] transfers   [] heat/ice application    [] other:      Other Objective/Functional Measures:     Pt reports 80-85% improvements. Pt states his main problem is weakness in quads, for going up steps. Pt reports he is going reciprocal for stairs now. Occasional difficulty getting up from  low chair. AROM L knee -4 to 120deg, PROM 0- 125deg     · Long Term Goals: To be accomplished in  12  treatments:  1. Patient to be independent & compliant with HEP in preparation for D/C. Pt reports partial compliance with HEP. 2. Patient to increase FOTO score to 77 indicating improved functional abilities and QOL. 3.     Patient to increase strength in L LE to 5, as evidenced by descending stairs with good control on L,  to facilitate stairs and transfers. progressed to 6in tap downs with good  stability on L and Butch UE assist.  Pt reports decreased stability on R knee.  5/23/22  4.    Patient will increase L knee AROM to 0 to 120 deg to facilitate transfers, dressing, and stairs. Progressing to 120deg today 5/23/22   5. Patient to increase walking tolerance to 30 min to facilitate community ambulation and upcomming vacation. pt reports he can easily walk a half mi. But hasn't walked further. 5/23/22    Pain Level (0-10 scale) post treatment: 0/10     ASSESSMENT/Changes in Function:   Patient continues to present with decreased TKE with gait and SLR lag of 10deg. Pt to be reassessed NV, with likely DC to HEP as pt is scheduled to have other knee done in August. Pt educated in [facility] 41. hang S with 5# to perform to attain TKE for gait/stability. Patient will continue to benefit from skilled PT services to modify and progress therapeutic interventions, address functional mobility deficits, address ROM deficits and address strength deficits to attain remaining goals.        []  See Plan of Care  [x]  See progress note/recertification  []  See Discharge Summary           PLAN  [x]  Upgrade activities as tolerated     [x]  Continue plan of care  []  Update interventions per flow sheet       []  Discharge due to:_  [x]  Other: discharge NV as pt scheduled for R TKA in Aug.     Michelle Floyd PT 5/23/2022  2:05 PM    Future Appointments   Date Time Provider Cole Kerns   5/23/2022  2:00 PM Jese Torrez, PT [facility]   5/31/2022  2:00 PM Stoney Chalk [facility]   6/2/2022  2:00 PM Stoney Chalk [facility]

## 2022-05-31 ENCOUNTER — APPOINTMENT (OUTPATIENT)
Dept: PHYSICAL THERAPY | Age: 77
End: 2022-05-31
Payer: COMMERCIAL

## 2022-06-02 ENCOUNTER — APPOINTMENT (OUTPATIENT)
Dept: PHYSICAL THERAPY | Age: 77
End: 2022-06-02
Payer: COMMERCIAL

## 2022-06-23 ENCOUNTER — HOSPITAL ENCOUNTER (OUTPATIENT)
Dept: PHYSICAL THERAPY | Age: 77
Discharge: HOME | End: 2022-06-23
Payer: COMMERCIAL

## 2022-06-23 PROCEDURE — 97140 MANUAL THERAPY 1/> REGIONS: CPT | Performed by: PHYSICAL THERAPIST

## 2022-06-23 PROCEDURE — 97530 THERAPEUTIC ACTIVITIES: CPT | Performed by: PHYSICAL THERAPIST

## 2022-06-23 PROCEDURE — 97110 THERAPEUTIC EXERCISES: CPT | Performed by: PHYSICAL THERAPIST

## 2022-06-23 NOTE — PROGRESS NOTES
PT DAILY TREATMENT NOTE     Patient Name: Ladan Mcdonough  Date:2022  : 1945  [x]  Patient  Verified  Payor: Richie Olivier / Plan: Oneal Florez / Product Type: HMO /    In time: 235 pm  Out time: 310 pm   Total Treatment Time (min): 45min   Visit #: 5 of 8-10    Medicare/BCBS Only   Total Timed Codes (min):  45 min 1:1 Treatment Time:  45 min       Treatment Area: Left knee pain [M25.562]    SUBJECTIVE  Pain Level (0-10 scale): 0/10  Any medication changes, allergies to medications, adverse drug reactions, diagnosis change, or new procedure performed?: [x] No    [] Yes (see summary sheet for update)  Subjective functional status/changes:   [] No changes reported  The only problem I have is getting up from a low surface.      OBJECTIVE    Modality rationale: decrease inflammation and decrease pain to improve the patients walking tolerance    Min Type Additional Details    [] Estim:  []Unatt       []IFC  []Premod                        []Other:  []w/ice   []w/heat  Position:  Location:    [] Estim: []Att    []TENS instruct  []NMES                    []Other:  []w/US   []w/ice   []w/heat  Position:  Location:    []  Traction: [] Cervical       []Lumbar                       [] Prone          []Supine                       []Intermittent   []Continuous Lbs:  [] before manual  [] after manual    []  Ultrasound: []Continuous   [] Pulsed                           []1MHz   []3MHz W/cm2:  Location:    []  Iontophoresis with dexamethasone         Location: [] Take home patch   [] In clinic   PD []  Ice     []  heat  []  Ice massage  []  Laser   []  Anodyne Position:  Location:    []  Laser with stim  []  Other:  Position:  Location:    []  Vasopneumatic Device  Pre-treatment girth:   Post-treatment girth:   Measured at landmark location:  Pressure:       [] lo [] med [] hi   Temperature: [] lo [] med [] hi   [] Skin assessment post-treatment:  []intact []redness- no adverse reaction    []redness - adverse reaction:   Vasopnuematic compression justification:  Per bilateral girth measures taken and listed above the edema is considered significant and having an impact on the patient's balance and gait      17 min Therapeutic Exercise:  [x] See flow sheet :  Bike for improved ROM   Incline stretch  Hamstring stretch  Heel raises, toe raises   Assessed on bike   Rationale: increase ROM and increase strength to improve the patients ability to ambulate stairs     20 min Therapeutic Activity:  []  See flow sheet :  Eccentric stands <> sit at mat  Step ups fwd, lat  3 way hip for improved standing tolerance  Tandem stance at instep for improved standing tolerance    Rationale: increase ROM and increase strength  to improve the patients walking tolerance      x min Neuromuscular Re-education:  []  See flow sheet :   Rationale: increase ROM and increase strength  to improve the patients gait stability     8 min Manual Therapy:  Reclined head elevated, PROM TKE mobes, flexion mobes and distraction , PROM flexion with A heel slide    The manual therapy interventions were performed at a separate and distinct time from the therapeutic activities interventions. Rationale: decrease pain and increase ROM to improve the patient's gait mechnica          With   [] TE   [] TA   [] neuro   [] other: Patient Education: [x] Review HEP    [] Progressed/Changed HEP based on:   [] positioning   [] body mechanics   [] transfers   [] heat/ice application    [] other:      Other Objective/Functional Measures:     Pt reports near 100% improvements. Patient reports he is scheduled for R TKA in August. Patient reports main challenge on L knee is getting up from low surface. Gait: limited by R knee antalgia, lacks complete TKE on L but performs heel toe gait  AROM L knee -2 to 115deg, PROM 0- 122deg   SLR Lag: -10 deg  Balance: SLS on L: 2 sec, tandem: 30sec.    Functional limitations: stiffness after prolonged sitting, getting up from lower surfaces, decreased eccentric strength in L quad   · Long Term Goals: To be accomplished in  12  treatments:  1. Patient to be independent & compliant with HEP in preparation for D/C. Pt reports partial compliance with HEP. Patient to increase FOTO score to 77 indicating improved functional abilities and QOL. Internet down/ FOTO unavailable but pt reports near 100% improvement on L knee. 6/23/22  3.     Patient to increase strength in L LE to 5, as evidenced by descending stairs with good control on L,  to facilitate stairs and transfers. Descends stairs with good control on L, but pt reports difficulty arising from low surfaces, which R knee pain most likely contributes to.  6/23/22  4.    Patient will increase L knee AROM to 0 to 120 deg to facilitate transfers, dressing, and stairs. Progressing, 115 deg AROM and PROM is 120deg today 6/23/22   5. Patient to increase walking tolerance to 30 min to facilitate community ambulation and upcomming vacation. pt reports he can easily walk a half mi. Or 30min. With L knee, most limited by R. 6/23/22    Pain Level (0-10 scale) post treatment: 0/10     ASSESSMENT/Changes in Function:      Patient will continue to benefit from skilled PT services to modify and progress therapeutic interventions, address functional mobility deficits, address ROM deficits and address strength deficits to attain remaining goals.        []  See Plan of Care  []  See progress note/recertification  []  See Discharge Summary           PLAN  []  Upgrade activities as tolerated     [x]  Continue plan of care  []  Update interventions per flow sheet       [x]  Discharge due to:_goals met  []  Other:     Rosanna Tate, PT 6/23/2022  2:05 PM    Future Appointments   Date Time Provider Cole Kerns   6/23/2022  2:45 PM Minus Lizet PT [facility]

## 2022-06-23 NOTE — PROGRESS NOTES
[address] [facility] AT [facility]  [address] [place] [address],[address], [facility], Cecile Varner  Phone [phone]  Fax [phone]  DISCHARGE  NOTE  Patient Name: Merrick Jenkins : 1945   Treatment/Medical Diagnosis: Left knee pain [M25.562]   Referral Source: Low Martin MD     Date of Initial Visit: 3/31/22 Attended Visits: 14 Missed Visits: 0     SUMMARY OF TREATMENT  Treatment consist of therapeutic exercise including ROM, stretching,  strengthening, stabilization training, patient education, HEP instruction, and manual therapy. CURRENT STATUS  Pt reports near 100% improvements. Patient reports he is scheduled for R TKA in August. Patient reports main challenge on L knee is getting up from low surface. Gait: limited by R knee antalgia, lacks complete TKE on L but performs heel toe gait  AROM L knee -2 to 115deg, PROM 0- 122deg   SLR Lag: -10 deg  Balance: SLS on L: 2 sec, tandem: 30sec. Functional limitations: stiffness after prolonged sitting, getting up from lower surfaces, decreased eccentric strength in L quad   · Long Term Goals: To be accomplished in  12  treatments:  1. Patient to be independent & compliant with HEP in preparation for D/C. Pt reports partial compliance with HEP. Patient to increase FOTO score to 77 indicating improved functional abilities and QOL. Internet down/ FOTO unavailable but pt reports near 100% improvement on L knee. 22  3.     Patient to increase strength in L LE to 5, as evidenced by descending stairs with good control on L,  to facilitate stairs and transfers. Descends stairs with good control on L, but pt reports difficulty arising from low surfaces, which R knee pain most likely contributes to.  22  4.    Patient will increase L knee AROM to 0 to 120 deg to facilitate transfers, dressing, and stairs. Progressing, 115 deg AROM and PROM is 120deg today 22   5.  Patient to increase walking tolerance to 30 min to facilitate community ambulation and upcomming vacation. pt reports he can easily walk a half mi. Or 30min. With L knee, most limited by R. 6/23/22    RECOMMENDATIONS  Patient to be discharged to [facility] for continued strengthening. If you have any questions/comments please contact us directly at [phone]. Thank you for allowing us to assist in the care of your patient. Therapist Signature: Josi Gagnon PT Date: 6/23/2022     Time: 7:37 PM   NOTE TO PHYSICIAN:  PLEASE COMPLETE THE ORDERS BELOW AND FAX TO   [facility] at [facility]: [phone]. If you are unable to process this request in 24 hours please contact our office: [phone].    ___ I have read the above report and request that my patient continue as recommended.   ___ I have read the above report and request that my patient continue therapy with the following changes/special instructions:_________________________________________________________   ___ I have read the above report and request that my patient be discharged from therapy.      Physician Signature:        Date:       Time:    Yessy Gooden MD

## 2022-08-25 ENCOUNTER — HOSPITAL ENCOUNTER (OUTPATIENT)
Dept: PHYSICAL THERAPY | Age: 77
Discharge: HOME | End: 2022-08-25
Payer: COMMERCIAL

## 2022-08-25 PROCEDURE — 97140 MANUAL THERAPY 1/> REGIONS: CPT

## 2022-08-25 PROCEDURE — 97110 THERAPEUTIC EXERCISES: CPT

## 2022-08-25 PROCEDURE — 97162 PT EVAL MOD COMPLEX 30 MIN: CPT

## 2022-08-25 NOTE — PROGRESS NOTES
PT DAILY TREATMENT NOTE 11    Patient Name: Bethany Cotton  Date:2022  : 1945  [x]  Patient  Verified  Payor: Kuldip Grossman / Plan: Isauro Garcia / Product Type: HMO /    In time:10:02  Out time:10:41  Total Treatment Time (min): 39  Visit #: 1 of 12    Medicare/BCBS Only   Total Timed Codes (min):  23 1:1 Treatment Time:  39       Treatment Area: Right knee pain [M25.561]    SUBJECTIVE  Pain Level (0-10 scale): 2/10  Any medication changes, allergies to medications, adverse drug reactions, diagnosis change, or new procedure performed?: [x] No    [] Yes (see summary sheet for update)  Subjective functional status/changes:   [] No changes reported  The patient presents with c/o right knee pain s/p TKA on 2022. He underwent HHPT for 3-4 visits following surgery. The patient is currently ambulating with hurry-cane or walker depending on how he is feeling that day. The patient is taking prescription pain medication about every 6 hours. The patient is currently sleeping on couch. The patient reports pain at worst 10/10, pain at best 0/10 when taking pain medication and when using ice. He reports his last fall was about 5 weeks before this surgery. The patient has 4 [address]UR[address] home and a flight of stairs inside that he is not currently using, states he is not having trouble with them. FOTO: 36/100.      OBJECTIVE    Modality rationale: decrease edema, decrease inflammation, and decrease pain to improve the patients ability to walk with LRAD    Min Type Additional Details    [] Estim:  []Unatt       []IFC  []Premod                        []Other:  []w/ice   []w/heat  Position:  Location:    [] Estim: []Att    []TENS instruct  []NMES                    []Other:  []w/US   []w/ice   []w/heat  Position:  Location:    []  Traction: [] Cervical       []Lumbar                       [] Prone          []Supine                       []Intermittent   []Continuous Lbs:  [] before manual  [] after manual    [] Ultrasound: []Continuous   [] Pulsed                           []1MHz   []3MHz W/cm2:  Location:    []  Iontophoresis with dexamethasone         Location: [] Take home patch   [] In clinic    []  Ice     []  heat  []  Ice massage  []  Laser   []  Anodyne Position:  Location:    []  Laser with stim  []  Other:  Position:  Location:    []  Vasopneumatic Device  Pre-treatment girth:   Post-treatment girth:   Measured at landmark location:  Pressure:       [] lo [] med [] hi   Temperature: [] lo [] med [] hi   [] Skin assessment post-treatment:  []intact []redness- no adverse reaction    []redness - adverse reaction:   Vasopnuematic compression justification:  Per bilateral girth measures taken and listed above the edema is considered significant and having an impact on the patient's balance, gait, and transfers    16 min [x]Eval                  []Re-Eval       15 min Therapeutic Exercise:  [] See flow sheet :  HEP development and review   Quad sets   Heel raises/toe raises seated  Heel slides  Recumbent bike for improved ROM   Patient education: gait mechanics, SPC in LUE, stair ambulation    Rationale: increase ROM and increase strength to improve the patients ability to ambulate with LRAD     x min Therapeutic Activity:  []  See flow sheet :   Rationale: increase ROM and increase strength  to improve the patients walking tolerance      x min Neuromuscular Re-education:  []  See flow sheet :   Rationale: increase ROM, increase strength, and improve balance  to improve the patients gait stability     8 min Manual Therapy:  RIGHT KNEE PROM    The manual therapy interventions were performed at a separate and distinct time from the therapeutic activities interventions.   Rationale: decrease pain and increase ROM to improve his gait mechanics          With   [x] TE   [] TA   [] neuro   [] other: Patient Education: [x] Review HEP    [] Progressed/Changed HEP based on:   [x] positioning   [x] body mechanics   [x] transfers   [x] heat/ice application    [] other:      Other Objective/Functional Measures:    Fall Risk Assessment: Does the patient have a fear of falling? YES If yes, what fall risk assessment was performed? 5xSTS: 16 seconds with heavy UE support, minimal WB through RLE and decreased height of stand   Gait: with SPC decreased step length, increased left trunk lean, limited heel strike   Stairs: step-to pattern with left ascending and right descending. Right knee AROM: 8-87 deg     Knee strength:   Right: extension 4+/5, flexion 4-/5  Left: 5/5 globally     Hip strength: right flexion 4-/5   Left flexion 4+/5      Girth: mid-patella 47 cm      Pain Level (0-10 scale) post treatment: 2/10    ASSESSMENT/Changes in Function:   The patient presents for evaluation to address right knee pain s/p TKA. He presents with limited right knee AROM and strength consistent with surgical interventions. Decreased functional mobility indicated by performance of 5xSTS test in 16 seconds with minimal WB through RLE and heavy UE support. Educated the patient on proper gait mechanics with SPC, safe stair ambulation and transfers. Developed and reviewed HEP. The patient will benefit from skilled PT to address above limitations and improve QoL. Patient will continue to benefit from skilled PT services to modify and progress therapeutic interventions, address functional mobility deficits, address ROM deficits, address strength deficits, analyze and address soft tissue restrictions, analyze and cue movement patterns, and analyze and modify body mechanics/ergonomics to attain remaining goals. [x]  See Plan of Care  []  See progress note/recertification  []  See Discharge Summary         Progress towards goals / Updated goals:  Short term goals to be accomplished in 4 visits: The pt will be IND and compliant with HEP and self management of symptoms.     The patient will improve right knee extension to 0 deg to improve his gait mechanics. Long term goals to be accomplished in 12 visits: The patient will improve right knee strength globally to 5/5 for improved transfer tolerance. The patient will improve B/L hip flexion strength to 5/5 for improved stair ambulation. The patient will demonstrate reciprocal pattern with ascending and descending stairs to improve home ambulation. The patient will improve 5xSTS score to 14 seconds without UE support as an indicator of improved functional mobility. The patient will improve FOTO score to 68/100 as a functional indicator of improved mobility.        PLAN  []  Upgrade activities as tolerated     []  Continue plan of care  []  Update interventions per flow sheet       []  Discharge due to:_  [x]  Other: the patient will benefit from skilled PT 2x/week for 12 visits       Justification for Eval Code Complexity:  Patient History : hx of right TKA 08/09/2022, hx of left TKA 02/2022  Examination see exam   Clinical Presentation: evolving with changing characteristics   Clinical Decision Making : FOTO : 39 /100     Piero Mera, PT 8/25/2022  10:05 AM    Future Appointments   Date Time Provider Cole Kerns   8/30/2022  2:00 PM Olga Lidia Wu, PT [facility]   9/2/2022  1:15 PM Olga Lidia Wu PT [facility]   9/6/2022  1:15 PM CHANTEL WebbT [facility]   9/8/2022  2:00 PM Tab Jiang PTA [facility]

## 2022-08-25 NOTE — PROGRESS NOTES
[address] PHYSICAL THERAPY AT [facility]  [address] [facility] [address], Cecile Roldan  Phone [phone]  Fax [phone] / [address] [place] [address] PHYSICAL THERAPY SERVICES  Patient Name: Saulo Lemons : 1945   Medical   Diagnosis: Right knee pain [M25.561] Treatment Diagnosis: Right knee pain   Onset Date: 2022     Referral Source: Leydi Gomez MD [facility]): 2022   Prior Hospitalization: See medical history Provider #: [id number]   Prior Level of Function: IND, 4 NURA home    Comorbidities: Diabetes, high blood pressure    Medications: Verified on Patient Summary List   The Plan of Care and following information is based on the information from the initial evaluation.   =================================================================================  Assessment / key information:    Subjective functional status/changes: The patient presents with c/o right knee pain s/p TKA on 2022. He underwent HHPT for 3-4 visits following surgery. The patient is currently ambulating with hurry-cane or walker depending on how he is feeling that day. The patient is taking prescription pain medication about every 6 hours. The patient is currently sleeping on couch. The patient reports pain at worst 10/10, pain at best 0/10 when taking pain medication and when using ice. He reports his last fall was about 5 weeks before this surgery. The patient has 4 NURA home and a flight of stairs inside that he is not currently using, states he is not having trouble with them. FOTO: 36/100. Other Objective/Functional Measures:        Fall Risk Assessment: Does the patient have a fear of falling? YES If yes, what fall risk assessment was performed?    5xSTS: 16 seconds with heavy UE support, minimal WB through RLE and decreased height of stand   Gait: with SPC decreased step length, increased left trunk lean, limited heel strike   Stairs: step-to pattern with left ascending and right descending. Right knee AROM: 8-87 deg      Knee strength:   Right: extension 4+/5, flexion 4-/5  Left: 5/5 globally      Hip strength: right flexion 4-/5              Left flexion 4+/5        Girth: mid-patella 47 cm       Pain Level (0-10 scale) post treatment: 2/10     ASSESSMENT/Changes in Function:   The patient presents for evaluation to address right knee pain s/p TKA. He presents with limited right knee AROM and strength consistent with surgical interventions. Decreased functional mobility indicated by performance of 5xSTS test in 16 seconds with minimal WB through RLE and heavy UE support. Educated the patient on proper gait mechanics with SPC, safe stair ambulation and transfers. Developed and reviewed HEP.  The patient will benefit from skilled PT to address above limitations and improve QoL.   =================================================================================  Eval Complexity: History: MEDIUM  Complexity : 1-2 comorbidities / personal factors will impact the outcome/ POC Exam:MEDIUM Complexity : 3 Standardized tests and measures addressing body structure, function, activity limitation and / or participation in recreation  Presentation: MEDIUM Complexity : Evolving with changing characteristics  Clinical Decision Making:MEDIUM Complexity : FOTO score of 26-74Overall Complexity:MEDIUM  Problem List: pain affecting function, decrease ROM, decrease strength, edema affecting function, impaired gait/ balance, decrease ADL/ functional abilitiies, decrease activity tolerance, and decrease flexibility/ joint mobility   Treatment Plan may include any combination of the following: Therapeutic exercise, Therapeutic activities, Neuromuscular re-education, Physical agent/modality, Gait/balance training, Manual therapy, and Patient education  Patient / Family readiness to learn indicated by: asking questions  Persons(s) to be included in education: patient (P)  Barriers to Learning/Limitations: None  Measures taken:    Patient Goal (s): Get better   Patient self reported health status: good  Rehabilitation Potential: good    Short term goals to be accomplished in 4 visits: The pt will be IND and compliant with HEP and self management of symptoms. The patient will improve right knee extension to 0 deg to improve his gait mechanics. Long term goals to be accomplished in 12 visits: The patient will improve right knee strength globally to 5/5 for improved transfer tolerance. The patient will improve B/L hip flexion strength to 5/5 for improved stair ambulation. The patient will demonstrate reciprocal pattern with ascending and descending stairs to improve home ambulation. The patient will improve 5xSTS score to 14 seconds without UE support as an indicator of improved functional mobility. The patient will improve FOTO score to 68/100 as a functional indicator of improved mobility. Frequency / Duration:   Patient to be seen  2  times per week for 12  treatments: (All LTG as above will be assessed and updated every 10 visits or 30 days and progressed as needed)    Patient / Caregiver education and instruction: exercises  Therapist Signature: Reji Luna PT Date: 7/09/7324   Certification Period: N/A Time: 1:29 PM   ===========================================================================================  I certify that the above Physical Therapy Services are being furnished while the patient is under my care. I agree with the treatment plan and certify that this therapy is necessary. Physician Signature:        Date:       Time:     Please sign and return to [facility] or you may fax the signed copy to [phone]. Thank you.   Maru Brasher MD

## 2022-08-30 ENCOUNTER — HOSPITAL ENCOUNTER (OUTPATIENT)
Dept: PHYSICAL THERAPY | Age: 77
Discharge: HOME | End: 2022-08-30
Payer: COMMERCIAL

## 2022-08-30 PROCEDURE — 97140 MANUAL THERAPY 1/> REGIONS: CPT

## 2022-08-30 PROCEDURE — 97110 THERAPEUTIC EXERCISES: CPT

## 2022-08-30 PROCEDURE — 97530 THERAPEUTIC ACTIVITIES: CPT

## 2022-08-30 PROCEDURE — 97016 VASOPNEUMATIC DEVICE THERAPY: CPT

## 2022-08-30 NOTE — PROGRESS NOTES
PT DAILY TREATMENT NOTE     Patient Name: Shahnaz Collins  Date:2022  : 1945  [x]  Patient  Verified  Payor: Meche Dong / Plan: Monty Choi / Product Type: HMO /    In time: 2:00 PM Out time: 2:45 PM  Total Treatment Time (min): 45 mins  Visit #: 2 of 12    Medicare/BCBS Only   Total Timed Codes (min):  37 1:1 Treatment Time:  35       Treatment Area: Right knee pain [M25.561]    SUBJECTIVE  Pain Level (0-10 scale): 1/10  Any medication changes, allergies to medications, adverse drug reactions, diagnosis change, or new procedure performed?: [x] No    [] Yes (see summary sheet for update)  Subjective functional status/changes:   [] No changes reported      OBJECTIVE    Modality rationale: decrease edema, decrease inflammation, and decrease pain to improve the patients ability to walk with LRAD    Min Type Additional Details    [] Estim:  []Unatt       []IFC  []Premod                        []Other:  []w/ice   []w/heat  Position:  Location:    [] Estim: []Att    []TENS instruct  []NMES                    []Other:  []w/US   []w/ice   []w/heat  Position:  Location:    []  Traction: [] Cervical       []Lumbar                       [] Prone          []Supine                       []Intermittent   []Continuous Lbs:  [] before manual  [] after manual    []  Ultrasound: []Continuous   [] Pulsed                           []1MHz   []3MHz W/cm2:  Location:    []  Iontophoresis with dexamethasone         Location: [] Take home patch   [] In clinic    []  Ice     []  heat  []  Ice massage  []  Laser   []  Anodyne Position:  Location:    []  Laser with stim  []  Other:  Position:  Location:   10' [x]  Vasopneumatic Device  Pre-treatment girth: 42 cm  Post-treatment girth: 41.5  Measured at landmark location: mid patella Pressure:       [x] lo [] med [] hi   Temperature: [x] lo [] med [] hi   [x] Skin assessment post-treatment:  [x]intact []redness- no adverse reaction    []redness - adverse reaction: Vasopnuematic compression justification:  Per bilateral girth measures taken and listed above the edema is considered significant and having an impact on the patient's balance, gait, and transfers        13 min Therapeutic Exercise:  [] See flow sheet :  Quad sets   Heel raises/toe raises seated  Heel slides  Recumbent bike for improved ROM    Rationale: increase ROM and increase strength to improve the patients ability to ambulate with LRAD     10 min Therapeutic Activity:  []  See flow sheet :  Step ups  SLR with quad set  HS curl  Mini squats   Rationale: increase ROM and increase strength  to improve the patients walking tolerance      x min Neuromuscular Re-education:  []  See flow sheet :   Rationale: increase ROM, increase strength, and improve balance  to improve the patients gait stability     12 min Manual Therapy:  RIGHT KNEE PROM    The manual therapy interventions were performed at a separate and distinct time from the therapeutic activities interventions. Rationale: decrease pain and increase ROM to improve his gait mechanics          With   [x] TE   [] TA   [] neuro   [] other: Patient Education: [x] Review HEP    [] Progressed/Changed HEP based on:   [x] positioning   [x] body mechanics   [] transfers   [] heat/ice application    [] other:      Other Objective/Functional Measures:  Introduced step ups, mini squats, HS curls and HR/TR  Demonstrates extensor lag during SLR        Pain Level (0-10 scale) post treatment: 2/10    ASSESSMENT/Changes in Function:   Patient tolerated the addition of new exercises well, however notes soreness in the anterior knee when performing mini squats and step ups. Patient demonstrates extensor lag during SLR and was unable to perform clamshells due to being unable to lay on his side. Patient notes no pain during PROM but states it feels tight. Continue to gradually progress as tolerated.     Patient will continue to benefit from skilled PT services to modify and progress therapeutic interventions, address functional mobility deficits, address ROM deficits, address strength deficits, analyze and address soft tissue restrictions, analyze and cue movement patterns, and analyze and modify body mechanics/ergonomics to attain remaining goals. [x]  See Plan of Care  []  See progress note/recertification  []  See Discharge Summary         Progress towards goals / Updated goals:  Short term goals to be accomplished in 4 visits: The pt will be IND and compliant with HEP and self management of symptoms. The patient will improve right knee extension to 0 deg to improve his gait mechanics. Long term goals to be accomplished in 12 visits: The patient will improve right knee strength globally to 5/5 for improved transfer tolerance. The patient will improve B/L hip flexion strength to 5/5 for improved stair ambulation. The patient will demonstrate reciprocal pattern with ascending and descending stairs to improve home ambulation. The patient will improve 5xSTS score to 14 seconds without UE support as an indicator of improved functional mobility. The patient will improve FOTO score to 68/100 as a functional indicator of improved mobility.        PLAN  [x]  Upgrade activities as tolerated     [x]  Continue plan of care  []  Update interventions per flow sheet       []  Discharge due to:_  []  Other:       Frankey Filbert, PTA 8/30/2022  10:05 AM    Future Appointments   Date Time Provider Cole Kerns   8/30/2022  2:00 PM Сергей Newell, [place] [facility]   9/1/2022  2:45 PM Сергей Newell PTA [facility]   9/7/2022  2:45 PM Florentino Perez PT [facility]   9/9/2022  1:15 PM Wayne Dodson PT [facility]   9/13/2022  2:45 PM Alexandria Antoine PT [facility]   9/15/2022  2:00 PM Mike Hatch [facility]   9/20/2022  2:45 PM Alexandria Antoine PT [facility]   9/22/2022  2:00 PM Mike Hatch [facility]   9/27/2022  2:00 PM Alexandria Antoine PT [facility] 9/29/2022  2:00 PM Sade Paredes PTA [facility]

## 2022-09-01 ENCOUNTER — HOSPITAL ENCOUNTER (OUTPATIENT)
Dept: PHYSICAL THERAPY | Age: 77
Discharge: HOME | End: 2022-09-01
Payer: COMMERCIAL

## 2022-09-01 PROCEDURE — 97530 THERAPEUTIC ACTIVITIES: CPT

## 2022-09-01 PROCEDURE — 97140 MANUAL THERAPY 1/> REGIONS: CPT

## 2022-09-01 PROCEDURE — 97110 THERAPEUTIC EXERCISES: CPT

## 2022-09-01 NOTE — PROGRESS NOTES
PT DAILY TREATMENT NOTE     Patient Name: Tru Heading  Date:2022  : 1945  [x]  Patient  Verified  Payor: Arnav Sadler / Plan: Drew Nine / Product Type: HMO /    In time: 2:45 PM Out time: 3:30  PM  Total Treatment Time (min): 45 mins  Visit #: 3 of 12    Medicare/BCBS Only   Total Timed Codes (min):  45 1:1 Treatment Time:  42       Treatment Area: Right knee pain [M25.561]    SUBJECTIVE  Pain Level (0-10 scale): 2/10  Any medication changes, allergies to medications, adverse drug reactions, diagnosis change, or new procedure performed?: [x] No    [] Yes (see summary sheet for update)  Subjective functional status/changes:   [] No changes reported  Patient reports his knee has felt good the past few days, just stiff before he starts moving in the morning.      OBJECTIVE    Modality rationale: decrease edema, decrease inflammation, and decrease pain to improve the patients ability to walk with LRAD    Min Type Additional Details    [] Estim:  []Unatt       []IFC  []Premod                        []Other:  []w/ice   []w/heat  Position:  Location:    [] Estim: []Att    []TENS instruct  []NMES                    []Other:  []w/US   []w/ice   []w/heat  Position:  Location:    []  Traction: [] Cervical       []Lumbar                       [] Prone          []Supine                       []Intermittent   []Continuous Lbs:  [] before manual  [] after manual    []  Ultrasound: []Continuous   [] Pulsed                           []1MHz   []3MHz W/cm2:  Location:    []  Iontophoresis with dexamethasone         Location: [] Take home patch   [] In clinic    []  Ice     []  heat  []  Ice massage  []  Laser   []  Anodyne Position:  Location:    []  Laser with stim  []  Other:  Position:  Location:    []  Vasopneumatic Device  Pre-treatment girth: 42 cm  Post-treatment girth: 41.5  Measured at landmark location: mid patella Pressure:       [] lo [] med [] hi   Temperature: [] lo [] med [] hi   [] Skin assessment post-treatment:  []intact []redness- no adverse reaction    []redness - adverse reaction:   Vasopnuematic compression justification:  Per bilateral girth measures taken and listed above the edema is considered significant and having an impact on the patient's balance, gait, and transfers        15 min Therapeutic Exercise:  [] See flow sheet :  Quad sets   Heel raises/toe raises seated  Heel slides  Recumbent bike for improved ROM    Rationale: increase ROM and increase strength to improve the patients ability to ambulate with LRAD     18 min Therapeutic Activity:  []  See flow sheet :  Step ups  SLR with quad set  HS curl  Standing marches  Mini squats   Rationale: increase ROM and increase strength  to improve the patients walking tolerance      x min Neuromuscular Re-education:  []  See flow sheet :   Rationale: increase ROM, increase strength, and improve balance  to improve the patients gait stability     12 min Manual Therapy:  RIGHT KNEE PROM    The manual therapy interventions were performed at a separate and distinct time from the therapeutic activities interventions. Rationale: decrease pain and increase ROM to improve his gait mechanics          With   [x] TE   [] TA   [] neuro   [] other: Patient Education: [x] Review HEP    [] Progressed/Changed HEP based on:   [x] positioning   [x] body mechanics   [] transfers   [] heat/ice application    [] other:      Other Objective/Functional Measures:  Patient reports compliance with HEP   R knee flexion with strap: 90 deg  Introduced standing marches  Progressed repetitions of exercises     Pain Level (0-10 scale) post treatment: 2/10    ASSESSMENT/Changes in Function:   Patient was able to complete full rotation during recumbant bike warm up today with no complaints of increased pain. Tolerated increased repetitions and addition of standing marches well with no increased symptoms.  Patient was able to reach 90 degrees of knee flexion with strap assist. Notes less tightness and soreness following PROM at end of session. Continue to advance as tolerated. Patient will continue to benefit from skilled PT services to modify and progress therapeutic interventions, address functional mobility deficits, address ROM deficits, address strength deficits, analyze and address soft tissue restrictions, analyze and cue movement patterns, and analyze and modify body mechanics/ergonomics to attain remaining goals. [x]  See Plan of Care  []  See progress note/recertification  []  See Discharge Summary         Progress towards goals / Updated goals:  Short term goals to be accomplished in 4 visits: The pt will be IND and compliant with HEP and self management of symptoms. MET 9/1/2022  The patient will improve right knee extension to 0 deg to improve his gait mechanics. Long term goals to be accomplished in 12 visits: The patient will improve right knee strength globally to 5/5 for improved transfer tolerance. The patient will improve B/L hip flexion strength to 5/5 for improved stair ambulation. The patient will demonstrate reciprocal pattern with ascending and descending stairs to improve home ambulation. The patient will improve 5xSTS score to 14 seconds without UE support as an indicator of improved functional mobility. The patient will improve FOTO score to 68/100 as a functional indicator of improved mobility.        PLAN  [x]  Upgrade activities as tolerated     [x]  Continue plan of care  []  Update interventions per flow sheet       []  Discharge due to:_  []  Other:       Nelson Spangler, PTA 9/1/2022  10:05 AM    Future Appointments   Date Time Provider Cole Kerns   9/7/2022  2:45 PM Delfino Royal, PT [facility]   9/9/2022  1:15 PM Emry Ground, PT [facility]   9/13/2022  2:45 PM Rausch Levo, PT [facility]   9/15/2022  2:00 PM Jenny Aggarwal [facility]   9/20/2022  2:45 PM Rausch Levo, PT [facility]   9/22/2022  2:00 PM Selene Sniff [facility]   9/27/2022  2:00 PM Violet Charlton PT [facility]   9/29/2022  2:00 PM Selene Sniff [facility]

## 2022-09-01 NOTE — PROGRESS NOTES
PT DAILY TREATMENT NOTE     Patient Name: Tru Heading  Date:2022  : 1945  [x]  Patient  Verified  Payor: Arnav Sadler / Plan: [employer]ake [employer] / Product Type: HMO /    In time: 2:45 PM Out time:  PM  Total Treatment Time (min):  mins  Visit #: 3 of 12    Medicare/BCBS Only   Total Timed Codes (min):   1:1 Treatment Time:         Treatment Area: Right knee pain [M25.561]    SUBJECTIVE  Pain Level (0-10 scale): 1/10  Any medication changes, allergies to medications, adverse drug reactions, diagnosis change, or new procedure performed?: [x] No    [] Yes (see summary sheet for update)  Subjective functional status/changes:   [] No changes reported  Reports his knee is feeling pretty good, just stiff.     OBJECTIVE    Modality rationale: decrease edema, decrease inflammation, and decrease pain to improve the patients ability to walk with LRAD    Min Type Additional Details    [] Estim:  []Unatt       []IFC  []Premod                        []Other:  []w/ice   []w/heat  Position:  Location:    [] Estim: []Att    []TENS instruct  []NMES                    []Other:  []w/US   []w/ice   []w/heat  Position:  Location:    []  Traction: [] Cervical       []Lumbar                       [] Prone          []Supine                       []Intermittent   []Continuous Lbs:  [] before manual  [] after manual    []  Ultrasound: []Continuous   [] Pulsed                           []1MHz   []3MHz W/cm2:  Location:    []  Iontophoresis with dexamethasone         Location: [] Take home patch   [] In clinic    []  Ice     []  heat  []  Ice massage  []  Laser   []  Anodyne Position:  Location:    []  Laser with stim  []  Other:  Position:  Location:   8' [x]  Vasopneumatic Device  Pre-treatment girth: 42 cm  Post-treatment girth: 41.5  Measured at landmark location: mid patella Pressure:       [x] lo [] med [] hi   Temperature: [x] lo [] med [] hi   [x] Skin assessment post-treatment:  [x]intact []redness- no adverse reaction []redness - adverse reaction:   Vasopnuematic compression justification:  Per bilateral girth measures taken and listed above the edema is considered significant and having an impact on the patient's balance, gait, and transfers        13 min Therapeutic Exercise:  [] See flow sheet :  Quad sets   Heel raises/toe raises seated  Heel slides  Recumbent bike for improved ROM    Rationale: increase ROM and increase strength to improve the patients ability to ambulate with LRAD     12 min Therapeutic Activity:  []  See flow sheet :  Step ups  SLR with quad set  HS curl  Mini squats   Rationale: increase ROM and increase strength  to improve the patients walking tolerance      x min Neuromuscular Re-education:  []  See flow sheet :   Rationale: increase ROM, increase strength, and improve balance  to improve the patients gait stability     12 min Manual Therapy:  RIGHT KNEE PROM    The manual therapy interventions were performed at a separate and distinct time from the therapeutic activities interventions. Rationale: decrease pain and increase ROM to improve his gait mechanics          With   [x] TE   [] TA   [] neuro   [] other: Patient Education: [x] Review HEP    [] Progressed/Changed HEP based on:   [x] positioning   [x] body mechanics   [] transfers   [] heat/ice application    [] other:      Other Objective/Functional Measures:  Introduced step ups, mini squats, HS curls and HR/TR  Demonstrates extensor lag during SLR        Pain Level (0-10 scale) post treatment: 2/10    ASSESSMENT/Changes in Function:   Patient tolerated the addition of new exercises well, however notes soreness in the anterior knee when performing mini squats and step ups. Patient demonstrates extensor lag during SLR and was unable to perform clamshells due to being unable to lay on his side. Patient notes no pain during PROM but states it feels tight. Continue to gradually progress as tolerated.     Patient will continue to benefit from skilled PT services to modify and progress therapeutic interventions, address functional mobility deficits, address ROM deficits, address strength deficits, analyze and address soft tissue restrictions, analyze and cue movement patterns, and analyze and modify body mechanics/ergonomics to attain remaining goals. [x]  See Plan of Care  []  See progress note/recertification  []  See Discharge Summary         Progress towards goals / Updated goals:  Short term goals to be accomplished in 4 visits: The pt will be IND and compliant with HEP and self management of symptoms. The patient will improve right knee extension to 0 deg to improve his gait mechanics. Long term goals to be accomplished in 12 visits: The patient will improve right knee strength globally to 5/5 for improved transfer tolerance. The patient will improve B/L hip flexion strength to 5/5 for improved stair ambulation. The patient will demonstrate reciprocal pattern with ascending and descending stairs to improve home ambulation. The patient will improve 5xSTS score to 14 seconds without UE support as an indicator of improved functional mobility. The patient will improve FOTO score to 68/100 as a functional indicator of improved mobility.        PLAN  [x]  Upgrade activities as tolerated     [x]  Continue plan of care  []  Update interventions per flow sheet       []  Discharge due to:_  []  Other:       Trish Ramirez, PTA 9/1/2022  10:05 AM    Future Appointments   Date Time Provider Cole Kerns   9/7/2022  2:45 PM Pietro Espitia, PT [facility]   9/9/2022  1:15 PM Denise Dang, PT [facility]   9/13/2022  2:45 PM Olga Lidia Wu PT [facility]   9/15/2022  2:00 PM Shila Coupe [facility]   9/20/2022  2:45 PM Olga Lidia Wu PT [facility]   9/22/2022  2:00 PM Shila Coupe [facility]   9/27/2022  2:00 PM Olga Lidia Wu, PT [facility]   9/29/2022  2:00 PM Juany Victoria [facility]

## 2022-09-02 ENCOUNTER — APPOINTMENT (OUTPATIENT)
Dept: PHYSICAL THERAPY | Age: 77
End: 2022-09-02
Payer: COMMERCIAL

## 2022-09-06 ENCOUNTER — APPOINTMENT (OUTPATIENT)
Dept: PHYSICAL THERAPY | Age: 77
End: 2022-09-06
Payer: COMMERCIAL

## 2022-09-07 ENCOUNTER — HOSPITAL ENCOUNTER (OUTPATIENT)
Dept: PHYSICAL THERAPY | Age: 77
Discharge: HOME | End: 2022-09-07
Payer: COMMERCIAL

## 2022-09-07 PROCEDURE — 97140 MANUAL THERAPY 1/> REGIONS: CPT

## 2022-09-07 PROCEDURE — 97110 THERAPEUTIC EXERCISES: CPT

## 2022-09-07 PROCEDURE — 97530 THERAPEUTIC ACTIVITIES: CPT

## 2022-09-07 NOTE — PROGRESS NOTES
PT DAILY TREATMENT NOTE     Patient Name: Radha Solis  Date:2022  : 1945  [x]  Patient  Verified  Payor: Danita Vela / Plan: Sandy Pickens / Product Type: HMO /    In time:245  Out time:328  Total Treatment Time (min): 43  Visit #: 4 of 12    Medicare/BCBS Only   Total Timed Codes (min):  43 1:1 Treatment Time:  38       Treatment Area: Right knee pain [M25.561]    SUBJECTIVE  Pain Level (0-10 scale): 1-2/10  Any medication changes, allergies to medications, adverse drug reactions, diagnosis change, or new procedure performed?: [x] No    [] Yes (see summary sheet for update)  Subjective functional status/changes:   [] No changes reported  I feel good. I had a cane up until today and now I feel good loosing it. I still use it going up and down steps but I probably don't need it.      OBJECTIVE    Modality rationale: decrease inflammation and decrease pain to improve the patients ability to reduce soreness after exercises    Min Type Additional Details    [] Estim:  []Unatt       []IFC  []Premod                        []Other:  []w/ice   []w/heat  Position:  Location:    [] Estim: []Att    []TENS instruct  []NMES                    []Other:  []w/US   []w/ice   []w/heat  Position:  Location:    []  Traction: [] Cervical       []Lumbar                       [] Prone          []Supine                       []Intermittent   []Continuous Lbs:  [] before manual  [] after manual    []  Ultrasound: []Continuous   [] Pulsed                           []1MHz   []3MHz W/cm2:  Location:    []  Iontophoresis with dexamethasone         Location: [] Take home patch   [] In clinic   PD []  Ice     []  heat  []  Ice massage  []  Laser   []  Anodyne Position:  Location:    []  Laser with stim  []  Other:  Position:  Location:    []  Vasopneumatic Device    []  Right     []  Left  Pre-treatment girth:  Post-treatment girth:  Measured at (location):  Pressure:       [] lo [] med [] hi   Temperature: [] lo [] med [] hi [] Skin assessment post-treatment:  []intact []redness- no adverse reaction    []redness - adverse reaction:     Vasopnuematic compression justification:  Per bilateral girth measures taken and listed above the edema is considered significant and having an impact on the patient's strength and gait/posture       15 min Therapeutic Exercise:  [x] See flow sheet :  Quad sets   Heel raises/toe raises seated  Heel slides  Recumbent bike for improved ROM    Rationale: increase ROM and increase strength to improve the patients ability to ambulate with LRAD      13 min Therapeutic Activity:  [x]  See flow sheet :  Step ups  SLR with quad set  HS curl  Standing marches  Mini squats   Rationale: increase ROM and increase strength  to improve the patients walking tolerance      x min Neuromuscular Re-education:  []  See flow sheet :   Rationale: increase ROM, increase strength, and improve balance  to improve the patients gait stability      15 min Manual Therapy: Right knee manual stretching into flexion/extension with overpressure, STM to distal Right hamstrings and quads   The manual therapy interventions were performed at a separate and distinct time from the therapeutic activities interventions.   Rationale: decrease pain and increase ROM to improve his gait mechanics                                                                         With   [] TE   [] TA   [] neuro   [] other: Patient Education: [x] Review HEP    [] Progressed/Changed HEP based on:   [] positioning   [] body mechanics   [] transfers   [] heat/ice application    [] other:      Other Objective/Functional Measures: circumduction noted with first step up but able to reduce with verbal cueing   Impaired eccentric control with SLR- cued to slowly lower leg but lowers with a plop  Right knee AROM: -10 to 104 deg    Pain Level (0-10 scale) post treatment: 0/10    ASSESSMENT/Changes in Function: Patient reports ambulating without cane starting today, for all ambulation except stair negotiation. He reports good stability without AD, and decreasing pain levels. Able to progress exercises today and Patient demonstrates improving ROM. Progress as able. Patient will continue to benefit from skilled PT services to modify and progress therapeutic interventions, address functional mobility deficits, address ROM deficits, address strength deficits, analyze and address soft tissue restrictions, analyze and cue movement patterns, analyze and modify body mechanics/ergonomics, assess and modify postural abnormalities, address imbalance/dizziness, and instruct in home and community integration to attain remaining goals. []  See Plan of Care  []  See progress note/recertification  []  See Discharge Summary         Progress towards goals / Updated goals:  Short term goals to be accomplished in 4 visits: The pt will be IND and compliant with HEP and self management of symptoms. MET 9/1/2022  The patient will improve right knee extension to 0 deg to improve his gait mechanics. Current: not met, 10 deg lacking from full extension 9/7/22   Long term goals to be accomplished in 12 visits: The patient will improve right knee strength globally to 5/5 for improved transfer tolerance. The patient will improve B/L hip flexion strength to 5/5 for improved stair ambulation. The patient will demonstrate reciprocal pattern with ascending and descending stairs to improve home ambulation. The patient will improve 5xSTS score to 14 seconds without UE support as an indicator of improved functional mobility. The patient will improve FOTO score to 68/100 as a functional indicator of improved mobility.      PLAN  [x]  Upgrade activities as tolerated     [x]  Continue plan of care  []  Update interventions per flow sheet       []  Discharge due to:_  []  Other:_      Radha Cano, PT 9/7/2022  2:22 PM    Future Appointments   Date Time Provider Cole Kerns   9/7/2022  2:45 PM Diamante Taylor, PT [facility]   9/9/2022  1:15 PM Andreas Mclean, PT [facility]   9/13/2022  2:45 PM Radha Eaton, PTA [facility]   9/15/2022  2:00 PM Jurline Nine [facility]   9/20/2022  2:45 PM Tavares Cole, PT [facility]   9/22/2022  2:00 PM Guerda Nine [facility]   9/27/2022  2:00 PM Tavares Cole, PT [facility]   9/29/2022  2:00 PM Guerda Nine [facility]

## 2022-09-08 ENCOUNTER — APPOINTMENT (OUTPATIENT)
Dept: PHYSICAL THERAPY | Age: 77
End: 2022-09-08
Payer: COMMERCIAL

## 2022-09-09 ENCOUNTER — HOSPITAL ENCOUNTER (OUTPATIENT)
Dept: PHYSICAL THERAPY | Age: 77
Discharge: HOME | End: 2022-09-09
Payer: COMMERCIAL

## 2022-09-09 PROCEDURE — 97530 THERAPEUTIC ACTIVITIES: CPT | Performed by: PHYSICAL THERAPIST

## 2022-09-09 PROCEDURE — 97110 THERAPEUTIC EXERCISES: CPT | Performed by: PHYSICAL THERAPIST

## 2022-09-09 PROCEDURE — 97140 MANUAL THERAPY 1/> REGIONS: CPT | Performed by: PHYSICAL THERAPIST

## 2022-09-09 NOTE — PROGRESS NOTES
PT DAILY TREATMENT NOTE     Patient Name: Shasta Tinoco  Date:2022  : 1945  [x]  Patient  Verified  Payor: Shivani Diaz / Plan: Nighat Mosqueda / Product Type: HMO /    In time: 1:10 pm  Out time: pm   Total Treatment Time (min): 52min  Visit #: 5 of 12    Medicare/BCBS Only   Total Timed Codes (min):  52 1:1 Treatment Time:  47       Treatment Area: Right knee pain [M25.561]    SUBJECTIVE  Pain Level (0-10 scale): 1-2/10  Any medication changes, allergies to medications, adverse drug reactions, diagnosis change, or new procedure performed?: [x] No    [] Yes (see summary sheet for update)  Subjective functional status/changes:   [] No changes reported  I feel good.      OBJECTIVE    Modality rationale: decrease inflammation and decrease pain to improve the patients ability to reduce soreness after exercises    Min Type Additional Details    [] Estim:  []Unatt       []IFC  []Premod                        []Other:  []w/ice   []w/heat  Position:  Location:    [] Estim: []Att    []TENS instruct  []NMES                    []Other:  []w/US   []w/ice   []w/heat  Position:  Location:    []  Traction: [] Cervical       []Lumbar                       [] Prone          []Supine                       []Intermittent   []Continuous Lbs:  [] before manual  [] after manual    []  Ultrasound: []Continuous   [] Pulsed                           []1MHz   []3MHz W/cm2:  Location:    []  Iontophoresis with dexamethasone         Location: [] Take home patch   [] In clinic   PD []  Ice     []  heat  []  Ice massage  []  Laser   []  Anodyne Position:  Location:    []  Laser with stim  []  Other:  Position:  Location:    []  Vasopneumatic Device    []  Right     []  Left  Pre-treatment girth:  Post-treatment girth:  Measured at (location):  Pressure:       [] lo [] med [] hi   Temperature: [] lo [] med [] hi   [] Skin assessment post-treatment:  []intact []redness- no adverse reaction    []redness - adverse reaction: Vasopnuematic compression justification:  Per bilateral girth measures taken and listed above the edema is considered significant and having an impact on the patient's strength and gait/posture       20/15 min Therapeutic Exercise:  [x] See flow sheet :  Quad sets   Heel raises/toe raises seated  Heel slides  Recumbent bike for improved ROM    Rationale: increase ROM and increase strength to improve the patients ability to ambulate with LRAD      17 min Therapeutic Activity:  [x]  See flow sheet :  Step ups  SLR with quad set  HS curl  Standing marches  Mini squats   Rationale: increase ROM and increase strength  to improve the patients walking tolerance      x min Neuromuscular Re-education:  []  See flow sheet :   Rationale: increase ROM, increase strength, and improve balance  to improve the patients gait stability      15 min Manual Therapy: Right knee manual stretching into flexion/extension with overpressure, STM to distal Right hamstrings and quads   The manual therapy interventions were performed at a separate and distinct time from the therapeutic activities interventions. Rationale: decrease pain and increase ROM to improve his gait mechanics                                                                         With   [] TE   [] TA   [] neuro   [] other: Patient Education: [x] Review HEP    [] Progressed/Changed HEP based on:   [] positioning   [] body mechanics   [] transfers   [] heat/ice application    [] other:      Other Objective/Functional Measures:   PROM flexion 105 deg, extension: -5deg  Progressed to 8in step today. Pain Level (0-10 scale) post treatment: 0-1/10    ASSESSMENT/Changes in Function: Patient presents with decreased TKE and quad lag with SLR. Tightness noted in Med HS tendon with toe out position noted in supine. Pt continues with mild circumduction and decreased TKE with gait (no AD needed). Encouraged patient to continue heel prop stretch with toe toward ceiling. Patient will continue to benefit from skilled PT services to modify and progress therapeutic interventions, address functional mobility deficits, address ROM deficits, address strength deficits, analyze and address soft tissue restrictions, analyze and cue movement patterns, analyze and modify body mechanics/ergonomics, assess and modify postural abnormalities, address imbalance/dizziness, and instruct in home and community integration to attain remaining goals. []  See Plan of Care  []  See progress note/recertification  []  See Discharge Summary         Progress towards goals / Updated goals:  Short term goals to be accomplished in 4 visits: The pt will be IND and compliant with HEP and self management of symptoms. MET 9/1/2022  The patient will improve right knee extension to 0 deg to improve his gait mechanics. -5 PROM 9/9/22   Current: not met, 10 deg lacking from full extension 9/7/22   Long term goals to be accomplished in 12 visits: The patient will improve right knee strength globally to 5/5 for improved transfer tolerance. The patient will improve B/L hip flexion strength to 5/5 for improved stair ambulation. The patient will demonstrate reciprocal pattern with ascending and descending stairs to improve home ambulation. The patient will improve 5xSTS score to 14 seconds without UE support as an indicator of improved functional mobility. The patient will improve FOTO score to 68/100 as a functional indicator of improved mobility.      PLAN  [x]  Upgrade activities as tolerated     [x]  Continue plan of care  []  Update interventions per flow sheet       []  Discharge due to:_  [x]  Other:_      Heidi Riggins PT 9/9/2022  2:22 PM    Future Appointments   Date Time Provider Cole Kerns   9/13/2022  2:45 PM Sagar Lopez PTA [facility]   9/15/2022  2:00 PM Eboni Liao [facility]   9/20/2022  2:45 PM Fernando Osborn PT [facility]   9/22/2022  2:00 PM Rosalina Brambila PTA [facility]   9/27/2022  2:00 PM Darlene Turner PT [facility]   9/29/2022  2:00 PM Yu Hugo [facility]

## 2022-09-13 ENCOUNTER — APPOINTMENT (OUTPATIENT)
Dept: PHYSICAL THERAPY | Age: 77
End: 2022-09-13
Payer: COMMERCIAL

## 2022-09-14 ENCOUNTER — HOSPITAL ENCOUNTER (OUTPATIENT)
Dept: PHYSICAL THERAPY | Age: 77
Discharge: HOME | End: 2022-09-14
Payer: COMMERCIAL

## 2022-09-14 PROCEDURE — 97140 MANUAL THERAPY 1/> REGIONS: CPT

## 2022-09-14 PROCEDURE — 97530 THERAPEUTIC ACTIVITIES: CPT

## 2022-09-14 PROCEDURE — 97110 THERAPEUTIC EXERCISES: CPT

## 2022-09-14 NOTE — PROGRESS NOTES
PT DAILY TREATMENT NOTE     Patient Name: Winsome Almeida  [id number]  : 1945  [x]  Patient  Verified  Payor: Jakubpepe Amaury / Plan: Octavio Quick / Product Type: HMO /    In time:159  Out time:241  Total Treatment Time (min): 42  Visit #: 6 of 12    Medicare/BCBS Only   Total Timed Codes (min):  42 1:1 Treatment Time:  39       Treatment Area: Right knee pain [M25.561]    SUBJECTIVE  Pain Level (0-10 scale): 1/10  Any medication changes, allergies to medications, adverse drug reactions, diagnosis change, or new procedure performed?: [x] No    [] Yes (see summary sheet for update)  Subjective functional status/changes:   [] No changes reported  I feel good, it's not too bad today.      OBJECTIVE    Modality rationale: decrease inflammation and decrease pain to improve the patients ability to reduce knee swelling and soreness   Min Type Additional Details    [] Estim:  []Unatt       []IFC  []Premod                        []Other:  []w/ice   []w/heat  Position:  Location:    [] Estim: []Att    []TENS instruct  []NMES                    []Other:  []w/US   []w/ice   []w/heat  Position:  Location:    []  Traction: [] Cervical       []Lumbar                       [] Prone          []Supine                       []Intermittent   []Continuous Lbs:  [] before manual  [] after manual    []  Ultrasound: []Continuous   [] Pulsed                           []1MHz   []3MHz W/cm2:  Location:    []  Iontophoresis with dexamethasone         Location: [] Take home patch   [] In clinic   PD []  Ice     []  heat  []  Ice massage  []  Laser   []  Anodyne Position:  Location:    []  Laser with stim  []  Other:  Position:  Location:    []  Vasopneumatic Device    []  Right     []  Left  Pre-treatment girth:  Post-treatment girth:  Measured at (location):  Pressure:       [] lo [] med [] hi   Temperature: [] lo [] med [] hi   [] Skin assessment post-treatment:  []intact []redness- no adverse reaction    []redness - adverse reaction:     Vasopnuematic compression justification:  Per bilateral girth measures taken and listed above the edema is considered significant and having an impact on the patient's strength and gait/posture     15 min Therapeutic Exercise:  [x] See flow sheet :  Quad sets   Heel raises/toe raises seated  Heel slides  Recumbent bike for improved ROM    Rationale: increase ROM and increase strength to improve the patients ability to ambulate with LRAD      15 min Therapeutic Activity:  [x]  See flow sheet :  Step ups forward, lateral   SLR with quad set  HS curl  Standing marches  Mini squats   Rationale: increase ROM and increase strength  to improve the patients walking tolerance, improve stability with stair negotiation     x min Neuromuscular Re-education:  []  See flow sheet :   Rationale: increase ROM, increase strength, and improve balance  to improve the patients gait stability      12 min Manual Therapy: Right knee manual stretching into flexion/extension with overpressure, STM to distal Right hamstrings and quads   The manual therapy interventions were performed at a separate and distinct time from the therapeutic activities interventions.   Rationale: decrease pain and increase ROM to improve his gait mechanics                                                                     With   [] TE   [] TA   [] neuro   [] other: Patient Education: [x] Review HEP    [] Progressed/Changed HEP based on:   [] positioning   [] body mechanics   [] transfers   [] heat/ice application    [] other:      Other Objective/Functional Measures: negotiates 4 steps in clinic with step to pattern, up with Left and down with Right; reports no difficulty with stairs (step to pattern at home as well)  Right knee strength: flexion 5/5, extension 4/5   Hip flexion strength: Right 4+/5, Left 5/5    Pain Level (0-10 scale) post treatment: 0/10    ASSESSMENT/Changes in Function: Patient reports improving mobility and decreasing pain levels in the Right knee. He does still take pain medication most nights due to increased discomfort at night, and reports sleeping on his couch due to better ability to stretch out Right knee. Improving strength noted today. Progress as able. Patient will continue to benefit from skilled PT services to modify and progress therapeutic interventions, address functional mobility deficits, address ROM deficits, address strength deficits, analyze and address soft tissue restrictions, analyze and cue movement patterns, analyze and modify body mechanics/ergonomics, assess and modify postural abnormalities, address imbalance/dizziness, and instruct in home and community integration to attain remaining goals. []  See Plan of Care  []  See progress note/recertification  []  See Discharge Summary         Progress towards goals / Updated goals:  Short term goals to be accomplished in 4 visits: The pt will be IND and compliant with HEP and self management of symptoms. MET 9/1/2022  The patient will improve right knee extension to 0 deg to improve his gait mechanics. -5 PROM 9/9/22              Current: not met, 10 deg lacking from full extension 9/7/22   Long term goals to be accomplished in 12 visits: The patient will improve right knee strength globally to 5/5 for improved transfer tolerance. Current: progressing, extension 4/5, flexion 5/5 9/14/22  The patient will improve B/L hip flexion strength to 5/5 for improved stair ambulation. Current: progressing, Right 4+/5, Left 5/5 9/14/22  The patient will demonstrate reciprocal pattern with ascending and descending stairs to improve home ambulation. Current: progressing, step to pattern but reports \"no difficulty\" with stairs 9/14/22  The patient will improve 5xSTS score to 14 seconds without UE support as an indicator of improved functional mobility. Current:   The patient will improve FOTO score to 68/100 as a functional indicator of improved mobility. Current:     PLAN  [x]  Upgrade activities as tolerated     [x]  Continue plan of care  []  Update interventions per flow sheet       []  Discharge due to:_  []  Other:_      Travis Whitfield, PT 9/14/2022  1:47 PM    Future Appointments   Date Time Provider Cole Kerns   9/14/2022  2:00 PM Arely Bloom [facility]   9/20/2022  2:45 PM Birgit Monroy PTA [facility]   9/22/2022  2:00 PM Julio Al PT [facility]   9/27/2022  2:00 PM Julio Al PT [facility]   9/29/2022  2:00 PM Gianluca Saldivar [facility]

## 2022-09-15 ENCOUNTER — APPOINTMENT (OUTPATIENT)
Dept: PHYSICAL THERAPY | Age: 77
End: 2022-09-15
Payer: COMMERCIAL

## 2022-09-20 ENCOUNTER — HOSPITAL ENCOUNTER (OUTPATIENT)
Dept: PHYSICAL THERAPY | Age: 77
Discharge: HOME | End: 2022-09-20
Payer: COMMERCIAL

## 2022-09-20 PROCEDURE — 97530 THERAPEUTIC ACTIVITIES: CPT

## 2022-09-20 PROCEDURE — 97110 THERAPEUTIC EXERCISES: CPT

## 2022-09-20 PROCEDURE — 97140 MANUAL THERAPY 1/> REGIONS: CPT

## 2022-09-20 NOTE — PROGRESS NOTES
PT DAILY TREATMENT NOTE     Patient Name: Rachel Nuñez  Date:2022  : 1945  [x]  Patient  Verified  Payor: Nalini  / Plan: [employer] / Product Type: HMO /    In time:245  Out time: 325  Total Treatment Time (min): 40  Visit #: 7 of 12    Medicare/BCBS Only   Total Timed Codes (min):  40 1:1 Treatment Time:  40       Treatment Area: Right knee pain [M25.561]    SUBJECTIVE  Pain Level (0-10 scale): 0/10  Any medication changes, allergies to medications, adverse drug reactions, diagnosis change, or new procedure performed?: [x] No    [] Yes (see summary sheet for update)  Subjective functional status/changes:   [] No changes reported  Reports his knee has felt good, notes he's had no pain the past few days.     OBJECTIVE    Modality rationale: decrease inflammation and decrease pain to improve the patients ability to reduce knee swelling and soreness   Min Type Additional Details    [] Estim:  []Unatt       []IFC  []Premod                        []Other:  []w/ice   []w/heat  Position:  Location:    [] Estim: []Att    []TENS instruct  []NMES                    []Other:  []w/US   []w/ice   []w/heat  Position:  Location:    []  Traction: [] Cervical       []Lumbar                       [] Prone          []Supine                       []Intermittent   []Continuous Lbs:  [] before manual  [] after manual    []  Ultrasound: []Continuous   [] Pulsed                           []1MHz   []3MHz W/cm2:  Location:    []  Iontophoresis with dexamethasone         Location: [] Take home patch   [] In clinic   PD []  Ice     []  heat  []  Ice massage  []  Laser   []  Anodyne Position:  Location:    []  Laser with stim  []  Other:  Position:  Location:    []  Vasopneumatic Device    []  Right     []  Left  Pre-treatment girth:  Post-treatment girth:  Measured at (location):  Pressure:       [] lo [] med [] hi   Temperature: [] lo [] med [] hi   [] Skin assessment post-treatment:  []intact []redness- no adverse reaction    []redness - adverse reaction:     Vasopnuematic compression justification:  Per bilateral girth measures taken and listed above the edema is considered significant and having an impact on the patient's strength and gait/posture     15 min Therapeutic Exercise:  [x] See flow sheet :  Quad sets   Heel raises/toe raises seated  Heel slides  Recumbent bike for improved ROM    Rationale: increase ROM and increase strength to improve the patients ability to ambulate with LRAD      15 min Therapeutic Activity:  [x]  See flow sheet :  Step ups forward, lateral   SLR with quad set  HS curl  Standing marches  Mini squats   Rationale: increase ROM and increase strength  to improve the patients walking tolerance, improve stability with stair negotiation     x min Neuromuscular Re-education:  []  See flow sheet :   Rationale: increase ROM, increase strength, and improve balance  to improve the patients gait stability      10 min Manual Therapy: Right knee manual stretching into flexion/extension with overpressure, STM to distal Right hamstrings and quads   The manual therapy interventions were performed at a separate and distinct time from the therapeutic activities interventions. Rationale: decrease pain and increase ROM to improve his gait mechanics                                                                     With   [] TE   [] TA   [] neuro   [] other: Patient Education: [x] Review HEP    [] Progressed/Changed HEP based on:   [] positioning   [] body mechanics   [] transfers   [] heat/ice application    [] other:      Other Objective/Functional Measures:   negotiates introduced sit to stands  able to perform 5x STS in 10 seconds  Increased resistance with LAQs and SLRs    Pain Level (0-10 scale) post treatment: 0/10    ASSESSMENT/Changes in Function:   Patient tolerated the increased resistance with no complaints of any increased symptoms.  Notes he feels he has increased stability in the ankle since beginning therapy and no longer requires an AD for ambulation. Continue to progress as tolerated. Patient will continue to benefit from skilled PT services to modify and progress therapeutic interventions, address functional mobility deficits, address ROM deficits, address strength deficits, analyze and address soft tissue restrictions, analyze and cue movement patterns, analyze and modify body mechanics/ergonomics, assess and modify postural abnormalities, address imbalance/dizziness, and instruct in home and community integration to attain remaining goals. []  See Plan of Care  []  See progress note/recertification  []  See Discharge Summary         Progress towards goals / Updated goals:  Short term goals to be accomplished in 4 visits: The pt will be IND and compliant with HEP and self management of symptoms. MET 9/1/2022  The patient will improve right knee extension to 0 deg to improve his gait mechanics. -5 PROM 9/9/22              Current: not met, 10 deg lacking from full extension 9/7/22   Long term goals to be accomplished in 12 visits: The patient will improve right knee strength globally to 5/5 for improved transfer tolerance. Current: progressing, extension 4/5, flexion 5/5 9/14/22  The patient will improve B/L hip flexion strength to 5/5 for improved stair ambulation. Current: progressing, Right 4+/5, Left 5/5 9/14/22  The patient will demonstrate reciprocal pattern with ascending and descending stairs to improve home ambulation. Current: progressing, step to pattern but reports \"no difficulty\" with stairs 9/14/22  The patient will improve 5xSTS score to 14 seconds without UE support as an indicator of improved functional mobility. Current: MET, able to perform 5x STS in 10 seconds (9/20/2022)  The patient will improve FOTO score to 68/100 as a functional indicator of improved mobility.     Current:     PLAN  [x]  Upgrade activities as tolerated     [x]  Continue plan of care  []  Update interventions per flow sheet       []  Discharge due to:_  [x]  Other:progress note Hector Munguia, SHIRLEY 9/20/2022  1:47 PM    Future Appointments   Date Time Provider Cole Kerns   9/22/2022  2:00 PM Vivian Hale, PT [facility]   9/27/2022  2:00 PM Vivian Hale PT [facility]   9/29/2022  2:00 PM Elvi Andrea [facility]

## 2022-09-22 ENCOUNTER — HOSPITAL ENCOUNTER (OUTPATIENT)
Dept: PHYSICAL THERAPY | Age: 77
Discharge: HOME | End: 2022-09-22
Payer: COMMERCIAL

## 2022-09-22 PROCEDURE — 97530 THERAPEUTIC ACTIVITIES: CPT

## 2022-09-22 PROCEDURE — 97140 MANUAL THERAPY 1/> REGIONS: CPT

## 2022-09-22 PROCEDURE — 97110 THERAPEUTIC EXERCISES: CPT

## 2022-09-22 NOTE — PROGRESS NOTES
PT DAILY TREATMENT NOTE     Patient Name: Gertrudis Molina  Date:2022  : 1945  [x]  Patient  Verified  Payor: Kelley Overall / Plan: [employer] / Product Type: HMO /    In time:200  Out time: 2:46   Total Treatment Time (min): 55   Visit #: 8 of 12    Medicare/BCBS Only   Total Timed Codes (min):  46  1:1 Treatment Time:  46        Treatment Area: Right knee pain [M25.561]    SUBJECTIVE  Pain Level (0-10 scale): 0/10  Any medication changes, allergies to medications, adverse drug reactions, diagnosis change, or new procedure performed?: [x] No    [] Yes (see summary sheet for update)  Subjective functional status/changes:   [] No changes reported  The patient reports his knee is feeling good.      OBJECTIVE    Modality rationale: decrease inflammation and decrease pain to improve the patients ability to reduce knee swelling and soreness   Min Type Additional Details    [] Estim:  []Unatt       []IFC  []Premod                        []Other:  []w/ice   []w/heat  Position:  Location:    [] Estim: []Att    []TENS instruct  []NMES                    []Other:  []w/US   []w/ice   []w/heat  Position:  Location:    []  Traction: [] Cervical       []Lumbar                       [] Prone          []Supine                       []Intermittent   []Continuous Lbs:  [] before manual  [] after manual    []  Ultrasound: []Continuous   [] Pulsed                           []1MHz   []3MHz W/cm2:  Location:    []  Iontophoresis with dexamethasone         Location: [] Take home patch   [] In clinic   PD []  Ice     []  heat  []  Ice massage  []  Laser   []  Anodyne Position:  Location:    []  Laser with stim  []  Other:  Position:  Location:    []  Vasopneumatic Device    []  Right     []  Left  Pre-treatment girth:  Post-treatment girth:  Measured at (location):  Pressure:       [] lo [] med [] hi   Temperature: [] lo [] med [] hi   [] Skin assessment post-treatment:  []intact []redness- no adverse reaction []redness - adverse reaction:     Vasopnuematic compression justification:  Per bilateral girth measures taken and listed above the edema is considered significant and having an impact on the patient's strength and gait/posture     20 min Therapeutic Exercise:  [x] See flow sheet :  Recumbent bike for improved ROM   Incline stretch  Hamstring stretch  Standing hamstring curl   LAQ  BKFO    Rationale: increase ROM and increase strength to improve the patients ability to ambulate with LRAD      16 min Therapeutic Activity:  [x]  See flow sheet :  Step ups forward, lateral   Hip 3 way for standing tolerance   Bridges   Tandem    Rationale: increase ROM and increase strength  to improve the patients walking tolerance, improve stability with stair negotiation     x min Neuromuscular Re-education:  []  See flow sheet :   Rationale: increase ROM, increase strength, and improve balance  to improve the patients gait stability      10 min Manual Therapy: Right knee manual stretching into flexion/extension with overpressure, STM to distal Right hamstrings and quads   The manual therapy interventions were performed at a separate and distinct time from the therapeutic activities interventions. Rationale: decrease pain and increase ROM to improve his gait mechanics                                                                     With   [] TE   [] TA   [] neuro   [] other: Patient Education: [x] Review HEP    [] Progressed/Changed HEP based on:   [] positioning   [] body mechanics   [] transfers   [] heat/ice application    [] other:      Other Objective/Functional Measures:   Good form during ambulation without SPC   Progressed heel and toe raises to half form   Added hip 3 way   Tandem: left 30 seconds, right 18 seconds   Right knee strength globally: 5/5     Pain Level (0-10 scale) post treatment: 0/10    ASSESSMENT/Changes in Function:   The patient continues to demonstrate good form during ambulation without SPC.  Added hip 3 way for improved standing tolerance. Noted limitation in balance indicated by performance of tandem stance on the right for 18 seconds max. He presents with improved right knee strength globally compared to IE. Plan to reassess nv. Patient will continue to benefit from skilled PT services to modify and progress therapeutic interventions, address functional mobility deficits, address ROM deficits, address strength deficits, analyze and address soft tissue restrictions, analyze and cue movement patterns, analyze and modify body mechanics/ergonomics, assess and modify postural abnormalities, address imbalance/dizziness, and instruct in home and community integration to attain remaining goals. []  See Plan of Care  []  See progress note/recertification  []  See Discharge Summary         Progress towards goals / Updated goals:  Short term goals to be accomplished in 4 visits: The pt will be IND and compliant with HEP and self management of symptoms. MET 9/1/2022  The patient will improve right knee extension to 0 deg to improve his gait mechanics. Current: not met, 10 deg lacking from full extension 9/7/22, -5 PROM 9/9/22     Long term goals to be accomplished in 12 visits: The patient will improve right knee strength globally to 5/5 for improved transfer tolerance. Current: progressing, extension 4/5, flexion 5/5 9/14/22, MET 5/5 globally 09/22/2022  The patient will improve B/L hip flexion strength to 5/5 for improved stair ambulation. Current: progressing, Right 4+/5, Left 5/5 9/14/22  The patient will demonstrate reciprocal pattern with ascending and descending stairs to improve home ambulation. Current: progressing, step to pattern but reports \"no difficulty\" with stairs 9/14/22  The patient will improve 5xSTS score to 14 seconds without UE support as an indicator of improved functional mobility.     Current: MET, able to perform 5x STS in 10 seconds (9/20/2022)  The patient will improve FOTO score to 68/100 as a functional indicator of improved mobility.     Current:     PLAN  [x]  Upgrade activities as tolerated     [x]  Continue plan of care  []  Update interventions per flow sheet       []  Discharge due to:_  [x]  Other: plan to reassess nv as it will be 30 days from IE on 09/24/2022    Jose A Deleon, PT 9/22/2022  1:47 PM    Future Appointments   Date Time Provider Cole Kerns   9/27/2022  2:00 PM Vivian Hale, PT [facility]   9/29/2022  2:00 PM Elvi Andrea [facility]

## 2022-09-27 ENCOUNTER — HOSPITAL ENCOUNTER (OUTPATIENT)
Dept: PHYSICAL THERAPY | Age: 77
Discharge: HOME | End: 2022-09-27
Payer: COMMERCIAL

## 2022-09-27 PROCEDURE — 97110 THERAPEUTIC EXERCISES: CPT

## 2022-09-27 NOTE — PROGRESS NOTES
PT DAILY TREATMENT NOTE     Patient Name: Kingsley Atkins  Date:2022  : 1945  [x]  Patient  Verified  Payor: Yajaira Tobias / Plan: Frankey Fleming / Product Type: HMO /    In time: 1:52  Out time: 2:45   Total Treatment Time (min): 53    Visit #: 9 of 12    Medicare/BCBS Only   Total Timed Codes (min):  53   1:1 Treatment Time:  53         Treatment Area: Right knee pain [M25.561]    SUBJECTIVE  Pain Level (0-10 scale): 0/10  Any medication changes, allergies to medications, adverse drug reactions, diagnosis change, or new procedure performed?: [x] No    [] Yes (see summary sheet for update)  Subjective functional status/changes:   [] No changes reported  \"I am feeling confident and can make today my last day. \"    % improved: 85-90%  Functional gains: pain at worst 1/10, no longer using SPC, reports no problem going up stairs into home, able to sit in car for long drive, no difficulty when walking through the grocery store   Functional limitations: occasionally has increased pain at night and will take medication to help him sleep comfortably, heavy activities around the home, yard work   FOTO: 59/100 (+23 points)     OBJECTIVE    Modality rationale: decrease inflammation and decrease pain to improve the patients ability to reduce knee swelling and soreness   Min Type Additional Details    [] Estim:  []Unatt       []IFC  []Premod                        []Other:  []w/ice   []w/heat  Position:  Location:    [] Estim: []Att    []TENS instruct  []NMES                    []Other:  []w/US   []w/ice   []w/heat  Position:  Location:    []  Traction: [] Cervical       []Lumbar                       [] Prone          []Supine                       []Intermittent   []Continuous Lbs:  [] before manual  [] after manual    []  Ultrasound: []Continuous   [] Pulsed                           []1MHz   []3MHz W/cm2:  Location:    []  Iontophoresis with dexamethasone         Location: [] Take home patch   [] In clinic PD []  Ice     []  heat  []  Ice massage  []  Laser   []  Anodyne Position:  Location:    []  Laser with stim  []  Other:  Position:  Location:    []  Vasopneumatic Device    []  Right     []  Left  Pre-treatment girth:  Post-treatment girth:  Measured at (location):  Pressure:       [] lo [] med [] hi   Temperature: [] lo [] med [] hi   [] Skin assessment post-treatment:  []intact []redness- no adverse reaction    []redness - adverse reaction:     Vasopnuematic compression justification:  Per bilateral girth measures taken and listed above the edema is considered significant and having an impact on the patient's strength and gait/posture     53  min Therapeutic Exercise:  [x] See flow sheet :  REASSESSMENT, FOTO, HEP DEVELOPMENT AND REVIEW   Recumbent bike for improved ROM   Incline stretch  Hamstring stretch  Standing hamstring curl   LAQ  BKFO   Hip 3 way for standing tolerance    Rationale: increase ROM and increase strength to improve the patients ability to ambulate with LRAD      x  min Therapeutic Activity:  [x]  See flow sheet :  Step ups forward, lateral   Bridges   Tandem    Rationale: increase ROM and increase strength  to improve the patients walking tolerance, improve stability with stair negotiation     x min Neuromuscular Re-education:  []  See flow sheet :   Rationale: increase ROM, increase strength, and improve balance  to improve the patients gait stability      held  min Manual Therapy: Right knee manual stretching into flexion/extension with overpressure, STM to distal Right hamstrings and quads   The manual therapy interventions were performed at a separate and distinct time from the therapeutic activities interventions.   Rationale: decrease pain and increase ROM to improve his gait mechanics                                                                     With   [] TE   [] TA   [] neuro   [] other: Patient Education: [x] Review HEP    [] Progressed/Changed HEP based on:   [] positioning   [] body mechanics   [] transfers   [] heat/ice application    [] other:      Other Objective/Functional Measures:   Ambulated stairs reciprocally, BUE support for safety   Right knee AROM: 1-115 deg   Right knee PROM: 0-120 deg   Right hip flexion strength: 4+/5      Pain Level (0-10 scale) post treatment: 0/10    ASSESSMENT/Changes in Function:   The patient presents for reassessment following 9 visits to address right knee pain s/p TKA on 08/09/2022. The patient demonstrates improved gait stability as he is ambulating without AD with good form. He presents with improved right knee AROM and strength since IE. The patient demonstrates reciprocal pattern when ascending and descending therapy stairs. He presents with improved functional mobility indicated by FOTO score increase of +23 points and performance of 5xSTS test in 10 seconds. The patient verbalized confidence in his ability to IND maintain progress made in therapy. The patient will be discharged with an updated HEP for IND management of symptoms. Patient will continue to benefit from skilled PT services to modify and progress therapeutic interventions, address functional mobility deficits, address ROM deficits, address strength deficits, analyze and address soft tissue restrictions, analyze and cue movement patterns, analyze and modify body mechanics/ergonomics, assess and modify postural abnormalities, address imbalance/dizziness, and instruct in home and community integration to attain remaining goals. []  See Plan of Care  []  See progress note/recertification  [x]  See Discharge Summary         Progress towards goals / Updated goals:  Short term goals to be accomplished in 4 visits: The pt will be IND and compliant with HEP and self management of symptoms. MET 9/1/2022  The patient will improve right knee extension to 0 deg to improve his gait mechanics.              Current: not met, 10 deg lacking from full extension 9/7/22, -5 PROM 9/9/22, NEARLY MET actively -1 deg  09/27/2022    Long term goals to be accomplished in 12 visits: The patient will improve right knee strength globally to 5/5 for improved transfer tolerance. Current: progressing, extension 4/5, flexion 5/5 9/14/22, MET 5/5 globally 09/22/2022  The patient will improve B/L hip flexion strength to 5/5 for improved stair ambulation. Current: progressing, Right 4+/5, Left 5/5 9/14/22, right hip flexion remains 4+/5 09/27/2022  The patient will demonstrate reciprocal pattern with ascending and descending stairs to improve home ambulation. Current: progressing, step to pattern but reports \"no difficulty\" with stairs 9/14/22, MET reciprocal pattern ascending and descending with BUE support for safety 09/27/2022  The patient will improve 5xSTS score to 14 seconds without UE support as an indicator of improved functional mobility. Current: MET, able to perform 5x STS in 10 seconds (9/20/2022)  The patient will improve FOTO score to 68/100 as a functional indicator of improved mobility.     Current: PROGRESSING 59/100 09/27/2022     PLAN  []  Upgrade activities as tolerated     []  Continue plan of care  []  Update interventions per flow sheet       [x]  Discharge due to: patient progress towards therapy goals   []  Other:      Moriah Murillo, PT 9/27/2022  1:47 PM    Future Appointments   Date Time Provider Cole Kerns   9/29/2022  2:00 PM Ankur Muñoz DPT [facility]

## 2022-09-27 NOTE — PROGRESS NOTES
[address] PHYSICAL THERAPY AT [facility]  [address] [place] [address],[address], [place], [place]ner  Phone [phone]  Fax  SUMMARY  Patient Name: Alec Chacon : 1945   Treatment/Medical Diagnosis: Right knee pain [M25.561]   Referral Source: Hesham Hancock MD     Date of Initial Visit: 2022 Attended Visits: 9 Missed Visits: 1     SUMMARY OF TREATMENT  Thank you for this referral. The pt has been seen for 9 visits to address right knee pain s/p TKA 2022. Therapeutic interventions have included therapeutic exercise, therapeutic activity, neuromuscular re-education, manual treatment, modalities and patient education. CURRENT STATUS  Subjective functional status/changes:    \"I am feeling confident and can make today my last day. \"     % improved: 85-90%  Functional gains: pain at worst /10, no longer using SPC, reports no problem going up stairs into home, able to sit in car for long drive, no difficulty when walking through the grocery store   Functional limitations: occasionally has increased pain at night and will take medication to help him sleep comfortably, heavy activities around the home, yard work   FOTO: 59/100 (+23 points)     Other Objective/Functional Measures:   Ambulated stairs reciprocally, BUE support for safety   Right knee AROM: 1-115 deg   Right knee PROM: 0-120 deg   Right hip flexion strength: 4+/5        Previous objective measures from IE:   Fall Risk Assessment: Does the patient have a fear of falling? YES If yes, what fall risk assessment was performed? 5xSTS: 16 seconds with heavy UE support, minimal WB through RLE and decreased height of stand   Gait: with SPC decreased step length, increased left trunk lean, limited heel strike   Stairs: step-to pattern with left ascending and right descending.       Right knee AROM: 8-87 deg      Knee strength:   Right: extension 4+/5, flexion 4-/5  Left: 5/5 globally      Hip strength: right flexion 4-/5              Left flexion 4+/5    Girth: mid-patella 47 cm       ASSESSMENT/Changes in Function:   The patient presents for reassessment following 9 visits to address right knee pain s/p TKA on 08/09/2022. The patient demonstrates improved gait stability as he is ambulating without AD with good form. He presents with improved right knee AROM and strength since IE. The patient demonstrates reciprocal pattern when ascending and descending therapy stairs. He presents with improved functional mobility indicated by FOTO score increase of +23 points and performance of 5xSTS test in 10 seconds. The patient verbalized confidence in his ability to IND maintain progress made in therapy. The patient will be discharged with an updated HEP for IND management of symptoms. Short term goals to be accomplished in 4 visits: The pt will be IND and compliant with HEP and self management of symptoms. MET 9/1/2022  The patient will improve right knee extension to 0 deg to improve his gait mechanics. Current: not met, 10 deg lacking from full extension 9/7/22, -5 PROM 9/9/22, NEARLY MET actively -1 deg  09/27/2022     Long term goals to be accomplished in 12 visits: The patient will improve right knee strength globally to 5/5 for improved transfer tolerance. Current: progressing, extension 4/5, flexion 5/5 9/14/22, MET 5/5 globally 09/22/2022  The patient will improve B/L hip flexion strength to 5/5 for improved stair ambulation. Current: progressing, Right 4+/5, Left 5/5 9/14/22, right hip flexion remains 4+/5 09/27/2022  The patient will demonstrate reciprocal pattern with ascending and descending stairs to improve home ambulation.                Current: progressing, step to pattern but reports \"no difficulty\" with stairs 9/14/22, MET reciprocal pattern ascending and descending with BUE support for safety 09/27/2022  The patient will improve 5xSTS score to 14 seconds without UE support as an indicator of improved functional mobility. Current: MET, able to perform 5x STS in 10 seconds (9/20/2022)  The patient will improve FOTO score to 68/100 as a functional indicator of improved mobility. Current: PROGRESSING 59/100 09/27/2022     RECOMMENDATIONS  Discontinue therapy. Progressing towards or have reached established goals. If you have any questions/comments please contact us directly at [phone]. Thank you for allowing us to assist in the care of your patient.     Therapist Signature: Roberto Shaffer, ODALIS Date: 09/27/2022     Time: 2:50 PM

## 2022-09-29 ENCOUNTER — APPOINTMENT (OUTPATIENT)
Dept: PHYSICAL THERAPY | Age: 77
End: 2022-09-29
Payer: COMMERCIAL

## 2022-09-29 PROBLEM — R10.84 ABDOMINAL PAIN, GENERALIZED: Status: ACTIVE | Noted: 2022-09-29

## 2022-09-29 PROBLEM — K92.2 UPPER GI BLEED: Status: ACTIVE | Noted: 2022-09-29

## 2022-09-29 PROBLEM — E87.20 ACIDOSIS: Status: ACTIVE | Noted: 2022-09-29

## 2024-12-23 ENCOUNTER — HOSPITAL ENCOUNTER (OUTPATIENT)
Facility: HOSPITAL | Age: 79
Setting detail: RECURRING SERIES
Discharge: HOME | End: 2024-12-26
Payer: COMMERCIAL

## 2024-12-23 PROCEDURE — 97161 PT EVAL LOW COMPLEX 20 MIN: CPT

## 2024-12-23 NOTE — PROGRESS NOTES
[facility]  [address], [facility], [address] Phone: [phone] Fax [phone]  Plan of Care / Statement of Necessity for Physical Therapy Services     Patient Name: Jyoti Pulido : 1945   Medical   Diagnosis: *Other low back pain [M54.59] Treatment Diagnosis: M54.59  OTHER LOWER BACK PAIN     Onset Date: ~ Payor: Payor: [facility] / Plan: [facility] [employer] / Product Type: *No Product type* /    Referral Source: Sajan Varner MD Start of Care (SOC): 2024   Prior Hospitalization: See medical history Provider #: [id number]   Prior Level of Function: IND with ADL/IADLs   Comorbidities: Hearing impaired, DM, HTN       Assessment / key information:  Pt is a 80 y/o male presenting with c/o atraumatic R-sided LBP and R knee pain that onset ~6 months ago. He also reports intermittent N/T along the R lateral thigh (not extending beyond the knee), along the sensory distribution of the lateral femoral cutaneous nerve. Upon evaluation, the pt demonstrates multiple areas of deficit mostly associated with mobility. Deficits are present in lumbar AROM, B knee extension ROM, hip mobility and stabilization strength, and flexibility along the posterior chain. Pt will benefit from skilled physical therapy services to address the functional deficits present so he may progress towards his PLOF.     Lumbar AROM:  Flexion: 75% with bent knees due to posterior chain tightness  Extension: 75% (minimal anterior translation of pelvis)  SB R: 75%   SB L: 25%  Rotation R: 50%  Rotation L: 25%     Hip MMT:  Flexion: 4/5 bilat   Extension: 4/5 bilat   Abduction: 4-/5 bilat   Adduction: 5/5 bilat      Knee MMT:  Flexion: 5/5 bilat  Extension: 5/5 bilat     *B knees lacking extension by ~5 degrees.      Lumbar Myotomes: WNL all levels bilat      Special Tests:  REYNA: (+) bilat for moderate mobility deficits; no symptom reproduction.   SLR: (-)  
   location/position:     min []  Paraffin,  details:     min []  Vasopneumatic Device, press/temp:     min []  Whirlpool / Fluido:    If using vaso (only need to measure limb vaso being performed on)      pre-treatment girth :       post-treatment girth :       measured at (landmark location) :      min []  Other:    Skin assessment post-treatment (if applicable):    []  intact    []  redness- no adverse reaction                 []redness - adverse reaction:          40 min   Eval - untimed                      Therapeutic Procedures:  Tx Min Billable or 1:1 Min (if diff from Tx Min) Procedure, Rationale, Specifics          Details if applicable:            Details if applicable:            Details if applicable:            Details if applicable:       [facility] Totals Reminder: bill using total billable min of TIMED therapeutic procedures (example: do not include dry needle or estim unattended, both untimed codes, in totals to left)  8-22 min = 1 unit; 23-37 min = 2 units; 38-52 min = 3 units; 53-67 min = 4 units; 68-82 min = 5 units   Total Total     [x]  Patient Education billed concurrently with other procedures   [x] Review HEP    [] Progressed/Changed HEP, detail:    [] Other detail:       Objective Information/Functional Measures/Assessment:    Lumbar AROM:  Flexion: 75% with bent knees due to posterior chain tightness  Extension: 75% (minimal anterior translation of pelvis)  SB R: 75%   SB L: 25%  Rotation R: 50%  Rotation L: 25%     Hip MMT:  Flexion: 4/5 bilat   Extension: 4/5 bilat   Abduction: 4-/5 bilat   Adduction: 5/5 bilat      Knee MMT:  Flexion: 5/5 bilat  Extension: 5/5 bilat     *B knees lacking extension by ~5 degrees.      Lumbar Myotomes: WNL all levels bilat      Special Tests:  REYNA: (+) bilat for moderate mobility deficits; no symptom reproduction.   SLR: (-)  bilat  90/90 SLR: (+) bilat for moderate mobility deficits      Oswestry: 40%     Bridge: Initially 25% with pain/pulling but improved

## 2024-12-24 ENCOUNTER — HOSPITAL ENCOUNTER (OUTPATIENT)
Facility: HOSPITAL | Age: 79
Setting detail: RECURRING SERIES
Discharge: HOME | End: 2024-12-27
Payer: COMMERCIAL

## 2024-12-24 PROCEDURE — 97530 THERAPEUTIC ACTIVITIES: CPT

## 2024-12-24 PROCEDURE — 97112 NEUROMUSCULAR REEDUCATION: CPT

## 2024-12-24 PROCEDURE — 97110 THERAPEUTIC EXERCISES: CPT

## 2024-12-24 NOTE — PROGRESS NOTES
PHYSICAL / OCCUPATIONAL THERAPY - DAILY TREATMENT NOTE (updated )    Patient Name: Jyoti Pulido    Date: 2024    : 1945  Insurance: Payor: JOSE FLOYD / Plan: [facility] [employer] / Product Type: *No Product type* /      Patient  verified yes     Visit #   Current / Total 2 24   Time   In / Out 2:00 2:47   Total Treatment Time 47   Pain   In / Out 1 0   Subjective Functional Status/Changes: Pt reports he is feeling a lot better today. No questions following the evaluation yesterday.      NEXT PROGRESS NOTE DUE: 2025    TREATMENT AREA =  Other low back pain [M54.59]    OBJECTIVE    Modalities Rationale:     decrease inflammation, decrease pain, and increase tissue extensibility to improve patient's ability to progress to PLOF and address remaining functional goals.     min [] Estim Unattended, type/location:                                      []  w/ice    []  w/heat    min [] Estim Attended, type/location:                                     []  w/US     []  w/ice    []  w/heat    []  TENS insruct      min []  Mechanical Traction: type/lbs                   []  pro   []  sup   []  int   []  cont    []  before manual    []  after manual    min []  Ultrasound, settings/location:      min []  Iontophoresis w/ dexamethasone, location:                                               []  take home patch       []  in clinic    min  unbilled []  Ice     []  Heat    location/position:     min []  Paraffin,  details:     min []  Vasopneumatic Device, press/temp:     min []  Whirlpool / Fluido:    If using vaso (only need to measure limb vaso being performed on)      pre-treatment girth :       post-treatment girth :       measured at (landmark location) :      min []  Other:    Skin assessment post-treatment (if applicable):    []  intact    []  redness- no adverse reaction                 []redness - adverse reaction:        Therapeutic Procedures:  Tx Min Billable or 1:1 Min (if diff from Tx Min)